# Patient Record
Sex: FEMALE | Race: WHITE | NOT HISPANIC OR LATINO | Employment: FULL TIME | URBAN - METROPOLITAN AREA
[De-identification: names, ages, dates, MRNs, and addresses within clinical notes are randomized per-mention and may not be internally consistent; named-entity substitution may affect disease eponyms.]

---

## 2017-01-10 ENCOUNTER — HOSPITAL ENCOUNTER (OUTPATIENT)
Dept: LAB | Facility: HOSPITAL | Age: 27
Setting detail: SPECIMEN
Discharge: HOME | End: 2017-01-10
Attending: INTERNAL MEDICINE | Admitting: INTERNAL MEDICINE

## 2017-01-10 LAB
ALBUMIN SERPL-MCNC: 3.5 G/DL (ref 3.5–4.8)
ALBUMIN/GLOB SERPL: 1.2 {RATIO} (ref 1–1.7)
ALP SERPL-CCNC: 38 IU/L (ref 32–91)
ALT SERPL-CCNC: 13 IU/L (ref 14–54)
ANION GAP SERPL CALC-SCNC: 8.6 MMOL/L (ref 10–20)
AST SERPL-CCNC: 20 IU/L (ref 15–41)
BILIRUB SERPL-MCNC: 1 MG/DL (ref 0.3–1.2)
BUN SERPL-MCNC: 9 MG/DL (ref 8–20)
BUN/CREAT SERPL: 15 (ref 5.4–26.2)
CALCIUM SERPL-MCNC: 8.9 MG/DL (ref 8.9–10.3)
CHLORIDE SERPL-SCNC: 106 MMOL/L (ref 101–111)
CHOLEST SERPL-MCNC: 196 MG/DL
CHOLEST/HDLC SERPL: 2.5 {RATIO}
CONV CO2: 29 MMOL/L (ref 22–32)
CONV LDL CHOLESTEROL DIRECT: 99 MG/DL (ref 0–100)
CONV MICROALBUM.,U,RANDOM: 22 MG/L
CONV TOTAL PROTEIN: 6.5 G/DL (ref 6.1–7.9)
CREAT 24H UR-MCNC: 260.3 MG/DL
CREAT UR-MCNC: 0.6 MG/DL (ref 0.4–1)
GLOBULIN UR ELPH-MCNC: 3 G/DL (ref 2.5–3.8)
GLUCOSE SERPL-MCNC: 92 MG/DL (ref 65–99)
HDLC SERPL-MCNC: 78 MG/DL
LDLC/HDLC SERPL: 1.3 {RATIO}
LIPID INTERPRETATION: NORMAL
MICROALBUMIN/CREAT UR: 8.5 UG/MG
POTASSIUM SERPL-SCNC: 3.6 MMOL/L (ref 3.6–5.1)
SODIUM SERPL-SCNC: 140 MMOL/L (ref 136–144)
TRIGL SERPL-MCNC: 83 MG/DL
VLDLC SERPL CALC-MCNC: 18.9 MG/DL

## 2017-01-17 ENCOUNTER — CONVERSION ENCOUNTER (OUTPATIENT)
Dept: ENDOCRINOLOGY | Facility: CLINIC | Age: 27
End: 2017-01-17

## 2017-03-03 ENCOUNTER — TELEPHONE (OUTPATIENT)
Dept: OBSTETRICS AND GYNECOLOGY | Age: 27
End: 2017-03-03

## 2017-03-03 RX ORDER — FLUCONAZOLE 150 MG/1
150 TABLET ORAL
Qty: 2 TABLET | Refills: 0 | Status: SHIPPED | OUTPATIENT
Start: 2017-03-03 | End: 2019-07-11 | Stop reason: SDUPTHER

## 2017-07-17 ENCOUNTER — HOSPITAL ENCOUNTER (OUTPATIENT)
Dept: LAB | Facility: HOSPITAL | Age: 27
Setting detail: SPECIMEN
Discharge: HOME | End: 2017-07-17
Attending: INTERNAL MEDICINE | Admitting: INTERNAL MEDICINE

## 2017-07-24 ENCOUNTER — CONVERSION ENCOUNTER (OUTPATIENT)
Dept: ENDOCRINOLOGY | Facility: CLINIC | Age: 27
End: 2017-07-24

## 2017-09-15 ENCOUNTER — TELEPHONE (OUTPATIENT)
Dept: OBSTETRICS AND GYNECOLOGY | Age: 27
End: 2017-09-15

## 2017-09-15 RX ORDER — NORGESTIMATE AND ETHINYL ESTRADIOL 7DAYSX3 28
1 KIT ORAL DAILY
Qty: 90 TABLET | Refills: 1 | Status: SHIPPED | OUTPATIENT
Start: 2017-09-15 | End: 2018-01-09 | Stop reason: SDUPTHER

## 2018-01-09 ENCOUNTER — OFFICE VISIT (OUTPATIENT)
Dept: OBSTETRICS AND GYNECOLOGY | Age: 28
End: 2018-01-09

## 2018-01-09 VITALS
HEIGHT: 65 IN | WEIGHT: 144 LBS | DIASTOLIC BLOOD PRESSURE: 62 MMHG | BODY MASS INDEX: 23.99 KG/M2 | SYSTOLIC BLOOD PRESSURE: 100 MMHG

## 2018-01-09 DIAGNOSIS — Z01.419 ENCOUNTER FOR GYNECOLOGICAL EXAMINATION: Primary | ICD-10-CM

## 2018-01-09 DIAGNOSIS — Z30.09 GENERAL COUNSELING AND ADVICE FOR CONTRACEPTIVE MANAGEMENT: ICD-10-CM

## 2018-01-09 PROCEDURE — 99395 PREV VISIT EST AGE 18-39: CPT | Performed by: OBSTETRICS & GYNECOLOGY

## 2018-01-09 RX ORDER — NORGESTIMATE AND ETHINYL ESTRADIOL 7DAYSX3 28
1 KIT ORAL DAILY
Qty: 90 TABLET | Refills: 4 | Status: SHIPPED | OUTPATIENT
Start: 2018-01-09 | End: 2018-09-07 | Stop reason: SDUPTHER

## 2018-01-09 NOTE — PROGRESS NOTES
"Subjective     Chief Complaint   Patient presents with   • Gynecologic Exam     Annual         History of Present Illness      Sandra Davidson is a very pleasant  27 y.o. female who presents for annual exam.  Mammo Exam In a, Contraception OCP's, Exercise 5 times a week including resistance and cardiovascular. Patient is happy on birth control pills. They want to postpone having children for a while. She has no dysmenorrhea or menorrhagia on the pill      Obstetric History:  OB History     No data available         Menstrual History:     Patient's last menstrual period was 12/26/2017.       Sexual History:       Past Medical History:   Diagnosis Date   • ASCUS of cervix with negative high risk HPV    • Bacterial vaginitis    • IDM (infant of diabetic mother)    • Proteinuria      Past Surgical History:   Procedure Laterality Date   • TOOTH EXTRACTION         SOCIAL Hx:      The following portions of the patient's history were reviewed and updated as appropriate: allergies, current medications, past family history, past medical history, past social history, past surgical history and problem list.    Review of Systems        Except as outlined in history of physical illness, patient denies any changes in her GYN, , GI systems.  All other systems reviewed were negative.         Objective   Physical Exam    /62  Ht 164.5 cm (64.75\")  Wt 65.3 kg (144 lb)  LMP 12/26/2017  BMI 24.15 kg/m2    General: Patient is alert and oriented and appears overall healthy  Neck: Is supple without thyromegaly, no carotid bruits and no lymphadenopathy  Lungs: Clear bilaterally, no wheezing, rhonchi, or rales.  Respiratory rate is normal  Breast: Even, symmetrical, no lymphadenopathy, no retraction, no masses or cysts  Heart: Regular rate and rhythm are appreciated, no murmurs or rubs are heard  Abdomen: Is soft, without organomegaly, bowel sounds are positive, there is no                                rebound or " guarding and palpation does not produce any discomfort  Back: Nontender without CVA tenderness  Pelvic: External genitalia appear normal and consistent with mature female.  BUS normal                            Vagina is clean dry without discharge and appears adequately estrogenized, no               lesions or masses are present                         Cervix is noninflamed without discharge or lesions.  There is no cervical motion             tenderness.                Uterus is nonenlarged, without tenderness, and no masses or abnormalities are  present               Adnexa are non-enlarged, non tender               Rectal exam reveals adequate sphincter tone and no masses or lesions are                     appreciated on digital rectal examination.    Patient Active Problem List   Diagnosis   • IDM (infant of diabetic mother)                 Assessment/Plan   Sandra was seen today for gynecologic exam.    Diagnoses and all orders for this visit:    Encounter for gynecological examination  -     IGP, Apt HPV,rfx 16 / 18,45 - ThinPrep Vial, Cervix    General counseling and advice for contraceptive management    IDM (infant of diabetic mother)    Other orders  -     norgestimate-ethinyl estradiol (TRINESSA, 28,) 0.18/0.215/0.25 MG-35 MCG per tablet; Take 1 tablet by mouth Daily.        Annual Well Woman Exam    Discussed today's findings and concerns with patient in detail.  Continue to recommend regular exercise to include cardiovascular and resistance training and breast self-exam. Wellness lab, mammography, and pap smear, in accordance with age guidelines.  Nutritional  recommendations  were discussed.    All of the patient's questions were addressed and answered, I have encouraged her to call for today's test results if she has not received them within 10 days.  Patient is advised to call with any change in her condition or with any other questions, otherwise return in 12 months for annual examination.

## 2018-01-11 LAB
CYTOLOGIST CVX/VAG CYTO: NORMAL
CYTOLOGY CVX/VAG DOC THIN PREP: NORMAL
DX ICD CODE: NORMAL
HIV 1 & 2 AB SER-IMP: NORMAL
HPV I/H RISK 4 DNA CVX QL PROBE+SIG AMP: NEGATIVE
Lab: NORMAL
OTHER STN SPEC: NORMAL
PATH REPORT.FINAL DX SPEC: NORMAL
STAT OF ADQ CVX/VAG CYTO-IMP: NORMAL

## 2018-01-23 ENCOUNTER — HOSPITAL ENCOUNTER (OUTPATIENT)
Dept: LAB | Facility: HOSPITAL | Age: 28
Setting detail: SPECIMEN
Discharge: HOME | End: 2018-01-23
Attending: INTERNAL MEDICINE | Admitting: INTERNAL MEDICINE

## 2018-01-23 LAB
ALBUMIN SERPL-MCNC: 3.8 G/DL (ref 3.5–4.8)
ALBUMIN/GLOB SERPL: 1.2 {RATIO} (ref 1–1.7)
ALP SERPL-CCNC: 36 IU/L (ref 32–91)
ALT SERPL-CCNC: 18 IU/L (ref 14–54)
ANION GAP SERPL CALC-SCNC: 11.7 MMOL/L (ref 10–20)
AST SERPL-CCNC: 26 IU/L (ref 15–41)
BILIRUB SERPL-MCNC: 1.1 MG/DL (ref 0.3–1.2)
BUN SERPL-MCNC: 9 MG/DL (ref 8–20)
BUN/CREAT SERPL: 12.9 (ref 5.4–26.2)
CALCIUM SERPL-MCNC: 9 MG/DL (ref 8.9–10.3)
CHLORIDE SERPL-SCNC: 104 MMOL/L (ref 101–111)
CHOLEST SERPL-MCNC: 221 MG/DL
CHOLEST/HDLC SERPL: 2.9 {RATIO}
CONV CO2: 26 MMOL/L (ref 22–32)
CONV LDL CHOLESTEROL DIRECT: 127 MG/DL (ref 0–100)
CONV MICROALBUM.,U,RANDOM: 16 MG/L
CONV TOTAL PROTEIN: 7.1 G/DL (ref 6.1–7.9)
CREAT 24H UR-MCNC: 179.4 MG/DL
CREAT UR-MCNC: 0.7 MG/DL (ref 0.4–1)
GLOBULIN UR ELPH-MCNC: 3.3 G/DL (ref 2.5–3.8)
GLUCOSE SERPL-MCNC: 88 MG/DL (ref 65–99)
HDLC SERPL-MCNC: 77 MG/DL
LDLC/HDLC SERPL: 1.7 {RATIO}
LIPID INTERPRETATION: ABNORMAL
MICROALBUMIN/CREAT UR: 8.9 UG/MG
POTASSIUM SERPL-SCNC: 3.7 MMOL/L (ref 3.6–5.1)
SODIUM SERPL-SCNC: 138 MMOL/L (ref 136–144)
TRIGL SERPL-MCNC: 67 MG/DL
VLDLC SERPL CALC-MCNC: 17.2 MG/DL

## 2018-01-30 ENCOUNTER — CONVERSION ENCOUNTER (OUTPATIENT)
Dept: ENDOCRINOLOGY | Facility: CLINIC | Age: 28
End: 2018-01-30

## 2018-07-25 ENCOUNTER — HOSPITAL ENCOUNTER (OUTPATIENT)
Dept: LAB | Facility: HOSPITAL | Age: 28
Setting detail: SPECIMEN
Discharge: HOME | End: 2018-07-25
Attending: INTERNAL MEDICINE | Admitting: INTERNAL MEDICINE

## 2018-08-01 ENCOUNTER — CONVERSION ENCOUNTER (OUTPATIENT)
Dept: ENDOCRINOLOGY | Facility: CLINIC | Age: 28
End: 2018-08-01

## 2018-09-07 RX ORDER — NORGESTIMATE AND ETHINYL ESTRADIOL 7DAYSX3 28
1 KIT ORAL DAILY
Qty: 90 TABLET | Refills: 3 | Status: SHIPPED | OUTPATIENT
Start: 2018-09-07 | End: 2019-01-15 | Stop reason: SDUPTHER

## 2019-01-15 ENCOUNTER — TELEPHONE (OUTPATIENT)
Dept: OBSTETRICS AND GYNECOLOGY | Age: 29
End: 2019-01-15

## 2019-01-15 ENCOUNTER — OFFICE VISIT (OUTPATIENT)
Dept: OBSTETRICS AND GYNECOLOGY | Age: 29
End: 2019-01-15

## 2019-01-15 VITALS
DIASTOLIC BLOOD PRESSURE: 72 MMHG | WEIGHT: 146 LBS | SYSTOLIC BLOOD PRESSURE: 120 MMHG | HEIGHT: 65 IN | BODY MASS INDEX: 24.32 KG/M2

## 2019-01-15 DIAGNOSIS — Z30.09 GENERAL COUNSELING AND ADVICE FOR CONTRACEPTIVE MANAGEMENT: ICD-10-CM

## 2019-01-15 DIAGNOSIS — Z01.419 ENCOUNTER FOR GYNECOLOGICAL EXAMINATION: Primary | ICD-10-CM

## 2019-01-15 PROCEDURE — 99395 PREV VISIT EST AGE 18-39: CPT | Performed by: OBSTETRICS & GYNECOLOGY

## 2019-01-15 RX ORDER — LEVOTHYROXINE SODIUM 0.1 MG/1
TABLET ORAL
COMMUNITY
End: 2020-01-21

## 2019-01-15 RX ORDER — NORGESTIMATE AND ETHINYL ESTRADIOL 7DAYSX3 28
1 KIT ORAL DAILY
Qty: 84 TABLET | Refills: 3 | Status: SHIPPED | OUTPATIENT
Start: 2019-01-15 | End: 2019-01-28 | Stop reason: SDUPTHER

## 2019-01-15 RX ORDER — NORGESTIMATE AND ETHINYL ESTRADIOL 7DAYSX3 28
1 KIT ORAL DAILY
Qty: 90 TABLET | Refills: 3 | Status: SHIPPED | OUTPATIENT
Start: 2019-01-15 | End: 2019-01-15

## 2019-01-15 RX ORDER — NORGESTIMATE AND ETHINYL ESTRADIOL 7DAYSX3 28
1 KIT ORAL DAILY
Qty: 78 TABLET | Refills: 4 | Status: SHIPPED | OUTPATIENT
Start: 2019-01-15 | End: 2019-01-15

## 2019-01-15 NOTE — PROGRESS NOTES
"Subjective     Chief Complaint   Patient presents with   • Gynecologic Exam     Annual:last pap 1/218,discuss different ocp's,trinessa d/c         History of Present Illness      Sandra Davidson is a very pleasant  28 y.o. female who presents for annual exam.  Mammo Exam no, Contraception OCPs, Exercise 5 times a week  Patient is doing well once to stay on OCPs, is following up with endocrinologist for all of her labs.      Obstetric History:  OB History     No data available         Menstrual History:     Patient's last menstrual period was 01/08/2019 (approximate).       Sexual History:       Past Medical History:   Diagnosis Date   • ASCUS of cervix with negative high risk HPV    • Bacterial vaginitis    • IDM (infant of diabetic mother)    • Proteinuria      Past Surgical History:   Procedure Laterality Date   • TOOTH EXTRACTION         SOCIAL Hx:      The following portions of the patient's history were reviewed and updated as appropriate: allergies, current medications, past family history, past medical history, past social history, past surgical history and problem list.    Review of Systems        Except as outlined in history of physical illness, patient denies any changes in her GYN, , GI systems.  All other systems reviewed were negative.         Objective   Physical Exam    /72   Ht 164.5 cm (64.75\")   Wt 66.2 kg (146 lb)   LMP 01/08/2019 (Approximate)   BMI 24.48 kg/m²     General: Patient is alert and oriented and appears overall healthy  Neck: Is supple without thyromegaly, no carotid bruits and no lymphadenopathy  Lungs: Clear bilaterally, no wheezing, rhonchi, or rales.  Respiratory rate is normal  Breast: Even, symmetrical, no lymphadenopathy, no retraction, no masses or cysts  Heart: Regular rate and rhythm are appreciated, no murmurs or rubs are heard  Abdomen: Is soft, without organomegaly, bowel sounds are positive, there is no                                rebound or " guarding and palpation does not produce any discomfort  Back: Nontender without CVA tenderness  Pelvic: External genitalia appear normal and consistent with mature female.  BUS normal                            Vagina is clean dry without discharge and appears adequately estrogenized, no               lesions or masses are present                         Cervix is noninflamed without discharge or lesions.  There is no cervical motion             tenderness.                Uterus is nonenlarged, without tenderness, and no masses or abnormalities are  present               Adnexa are non-enlarged, non tender               Rectal exam reveals adequate sphincter tone and no masses or lesions are                     appreciated on digital rectal examination.      Annual Well Woman Exam  Patient Active Problem List   Diagnosis   • IDM (infant of diabetic mother)                 Assessment/Plan   Sandra was seen today for gynecologic exam.    Diagnoses and all orders for this visit:    Encounter for gynecological examination  -     IGP, Apt HPV,rfx 16 / 18,45    General counseling and advice for contraceptive management    Other orders  -     Discontinue: norgestimate-ethinyl estradiol (TRINESSA, 28,) 0.18/0.215/0.25 MG-35 MCG per tablet; Take 1 tablet by mouth Daily.  -     norgestimate-ethinyl estradiol (TRI-SPRINTEC) 0.18/0.215/0.25 MG-35 MCG per tablet; Take 1 tablet by mouth Daily.            Discussed today's findings and concerns with patient in detail.  Continue to recommend regular exercise to include cardiovascular and resistance training and breast self-exam. Wellness lab, mammography, and pap smear, in accordance with age guidelines.  Nutritional  recommendations  were discussed.    All of the patient's questions were addressed and answered, I have encouraged her to call for today's test results if she has not received them within 10 days.  Patient is advised to call with any change in her condition or with  any other questions, otherwise return in 12 months for annual examination.

## 2019-01-15 NOTE — TELEPHONE ENCOUNTER
Esme called from [facility] stating that RX for Sprintec needs to be #84 with 3 refills. Please resend

## 2019-01-19 LAB
CYTOLOGIST CVX/VAG CYTO: NORMAL
CYTOLOGY CVX/VAG DOC THIN PREP: NORMAL
DX ICD CODE: NORMAL
HIV 1 & 2 AB SER-IMP: NORMAL
HPV I/H RISK 4 DNA CVX QL PROBE+SIG AMP: NEGATIVE
OTHER STN SPEC: NORMAL
PATH REPORT.FINAL DX SPEC: NORMAL
STAT OF ADQ CVX/VAG CYTO-IMP: NORMAL

## 2019-01-28 RX ORDER — NORGESTIMATE AND ETHINYL ESTRADIOL 7DAYSX3 28
1 KIT ORAL DAILY
Qty: 84 TABLET | Refills: 3 | Status: SHIPPED | OUTPATIENT
Start: 2019-01-28 | End: 2019-10-15

## 2019-01-30 ENCOUNTER — TELEPHONE (OUTPATIENT)
Dept: OBSTETRICS AND GYNECOLOGY | Age: 29
End: 2019-01-30

## 2019-02-05 ENCOUNTER — CONVERSION ENCOUNTER (OUTPATIENT)
Dept: ENDOCRINOLOGY | Facility: CLINIC | Age: 29
End: 2019-02-05

## 2019-06-04 VITALS
DIASTOLIC BLOOD PRESSURE: 70 MMHG | WEIGHT: 148 LBS | OXYGEN SATURATION: 99 % | WEIGHT: 146 LBS | BODY MASS INDEX: 24.15 KG/M2 | HEART RATE: 67 BPM | SYSTOLIC BLOOD PRESSURE: 105 MMHG | HEIGHT: 65 IN | HEART RATE: 67 BPM | OXYGEN SATURATION: 99 % | WEIGHT: 144 LBS | BODY MASS INDEX: 24.49 KG/M2 | HEART RATE: 68 BPM | OXYGEN SATURATION: 97 % | BODY MASS INDEX: 25.96 KG/M2 | HEIGHT: 65 IN | BODY MASS INDEX: 24.3 KG/M2 | SYSTOLIC BLOOD PRESSURE: 104 MMHG | OXYGEN SATURATION: 99 % | SYSTOLIC BLOOD PRESSURE: 120 MMHG | DIASTOLIC BLOOD PRESSURE: 80 MMHG | BODY MASS INDEX: 24.66 KG/M2 | HEART RATE: 78 BPM | SYSTOLIC BLOOD PRESSURE: 130 MMHG | DIASTOLIC BLOOD PRESSURE: 80 MMHG | SYSTOLIC BLOOD PRESSURE: 125 MMHG | DIASTOLIC BLOOD PRESSURE: 69 MMHG | OXYGEN SATURATION: 98 % | DIASTOLIC BLOOD PRESSURE: 70 MMHG | HEART RATE: 84 BPM | WEIGHT: 156 LBS | WEIGHT: 147 LBS

## 2019-07-11 ENCOUNTER — TELEPHONE (OUTPATIENT)
Dept: OBSTETRICS AND GYNECOLOGY | Age: 29
End: 2019-07-11

## 2019-07-11 RX ORDER — FLUCONAZOLE 150 MG/1
150 TABLET ORAL
Qty: 2 TABLET | Refills: 0 | Status: SHIPPED | OUTPATIENT
Start: 2019-07-11 | End: 2019-09-09 | Stop reason: SDUPTHER

## 2019-07-11 NOTE — TELEPHONE ENCOUNTER
Dr QUINTERO pt believes she has the beginning of a yeast inf, sx: little itch and d/c, on bcp. Pt requests diflucan to pharm on file, [facility].    NO NEED TO CALL PT UNLESS ISSUES.

## 2019-07-16 RX ORDER — LEVOTHYROXINE SODIUM 50 MCG
TABLET ORAL
Qty: 90 TABLET | Refills: 3 | Status: SHIPPED | OUTPATIENT
Start: 2019-07-16 | End: 2020-06-30

## 2019-08-06 ENCOUNTER — OFFICE VISIT (OUTPATIENT)
Dept: ENDOCRINOLOGY | Facility: CLINIC | Age: 29
End: 2019-08-06

## 2019-08-06 VITALS
BODY MASS INDEX: 26.33 KG/M2 | SYSTOLIC BLOOD PRESSURE: 108 MMHG | HEIGHT: 65 IN | DIASTOLIC BLOOD PRESSURE: 82 MMHG | HEART RATE: 72 BPM | WEIGHT: 158 LBS | OXYGEN SATURATION: 97 %

## 2019-08-06 DIAGNOSIS — E10.65 TYPE 1 DIABETES MELLITUS WITH HYPERGLYCEMIA (HCC): Primary | ICD-10-CM

## 2019-08-06 DIAGNOSIS — Z96.41 INSULIN PUMP STATUS: ICD-10-CM

## 2019-08-06 DIAGNOSIS — E06.3 HYPOTHYROIDISM DUE TO HASHIMOTO'S THYROIDITIS: ICD-10-CM

## 2019-08-06 DIAGNOSIS — E03.8 HYPOTHYROIDISM DUE TO HASHIMOTO'S THYROIDITIS: ICD-10-CM

## 2019-08-06 PROBLEM — E03.9 HYPOTHYROIDISM: Status: ACTIVE | Noted: 2018-01-30

## 2019-08-06 LAB — GLUCOSE BLDC GLUCOMTR-MCNC: 220 MG/DL (ref 70–130)

## 2019-08-06 PROCEDURE — 82962 GLUCOSE BLOOD TEST: CPT | Performed by: INTERNAL MEDICINE

## 2019-08-06 PROCEDURE — 99214 OFFICE O/P EST MOD 30 MIN: CPT | Performed by: INTERNAL MEDICINE

## 2019-08-06 RX ORDER — INSULIN GLARGINE 100 [IU]/ML
INJECTION, SOLUTION SUBCUTANEOUS DAILY
COMMUNITY

## 2019-08-06 NOTE — PATIENT INSTRUCTIONS
Continue exercise.  Increase basal rate to 0.9 units/h from 6a- midnight.  Wait 5-10 min to eat after breakfast bolus. Use extended bolus if needed.  Call if blood sugars are running under 100 or over 200.  F/u in 6 months, with fasting labs prior.

## 2019-08-12 NOTE — PROGRESS NOTES
[facility]        Patient Care Team:  Lara Rodríguez MD as PCP - General (Family Medicine)    Chief Complaint:    Chief Complaint   Patient presents with   • Diabetes     type 1 basal rates is 0.85 every hour          Subjective   Here for diabetes f/u  Blood sugars higher after breakfast  Checks blood sugars 4x/d for the last 3 months. Adjusts insulin dose based on results.  Insulin delivery per pump: Basal 42% / bolus 58%    Exercise program: doing cardio 2x/ week  Due for eye exam    Interval History:     Patient Complaints: none  Patient Denies:  hypoglycemia  History taken from: patient    Review of Systems:   Review of Systems   Eyes: Negative for blurred vision.   Gastrointestinal: Negative for nausea.   Endocrine: Negative for polyuria.   Neurological: Negative for headache.     Gained 10 lb since last visit    Objective     Vital Signs      Vitals:    08/06/19 0843   BP: 108/82   Pulse: 72   SpO2: 97%         Physical Exam:     General Appearance:    Alert, cooperative, in no acute distress   Head:    Normocephalic, without obvious abnormality, atraumatic   Eyes:            Lids and lashes normal, conjunctivae and sclerae normal, no   icterus, no pallor, corneas clear, PERRLA   Throat:   No oral lesions,  oral mucosa moist   Neck:   No adenopathy, supple,  no thyromegaly, no   carotid bruit   Lungs:     Clear    Heart:    Regular rhythm and normal rate   Chest Wall:    No abnormalities observed   Abdomen:     Normal bowel sounds, soft                 Extremities:   Moves all extremities well, no edema               Pulses:   Pulses palpable and equal bilaterally   Skin:   Pump site clean   Neurologic:  DTR 1+ in ankles, normal vibratory sense in toes, able to feel the 10g monofilament          Results Review:    I have reviewed the patient's new clinical results, labs & imaging.    Medication Review:   Prior to Admission medications    Medication Sig Start Date End Date Taking?  Authorizing Provider   glucagon (GLUCAGON EMERGENCY) 1 MG injection Inject 1 mg under the skin into the appropriate area as directed 1 (One) Time As Needed for Low Blood Sugar.   Yes Rick Ahuja MD   Glucose Blood (ELVIS CONTOUR TEST VI) by In Vitro route. Use to check blood sugar before meals and at bedtime   Yes Rick Ahuja MD   insulin glargine (LANTUS) 100 UNIT/ML injection Inject  under the skin into the appropriate area as directed Daily. Use in case of pump failure 28 units daily   Yes Rick Ahuja MD   Multiple Vitamins-Minerals (MULTIVITAMIN ADULT PO) Take  by mouth Daily.   Yes Rick Auhja MD   norgestimate-ethinyl estradiol (TRI-SPRINTEC) 0.18/0.215/0.25 MG-35 MCG per tablet Take 1 tablet by mouth Daily. 1/28/19 1/28/20 Yes Iban Jacobs MD   NOVOLOG 100 UNIT/ML injection 1 unit per 8 grams carbs at breakfast, 1 unit per 10 carbs for lunch/supper max dose: 60 units per day use with the insulin pump dx:e10.65 7/19/19  Yes Dinesh Martin MD   SYNTHROID 50 MCG tablet TAKE 1 TABLET DAILY 7/16/19  Yes Dinesh Martin MD   fluconazole (DIFLUCAN) 150 MG tablet Take 1 tablet by mouth Every 3 (Three) Days. 7/11/19   Roz Lujan PA   insulin lispro (HUMALOG) 100 UNIT/ML injection Inject  under the skin into the appropriate area as directed 3 (Three) Times a Day Before Meals.    Rick Ahuja MD   levothyroxine (SYNTHROID, LEVOTHROID) 100 MCG tablet     Rick Ahuja MD       Lab Results (most recent)     Procedure Component Value Units Date/Time    POC Glucose Fingerstick [[id number]]  (Abnormal) Collected:  08/06/19 0850    Specimen:  Blood Updated:  08/06/19 0851     Glucose 220 mg/dL      Comment: ate@730a this morning, 1.5 hours ago                  Lab Results   Component Value Date    GLUCOSE 88 01/23/2018    BUN 9 01/23/2018    CREATININE 0.7 01/23/2018    BCR 12.9 01/23/2018    K 3.7 01/23/2018    CO2 26 01/23/2018    CALCIUM 9.0  01/23/2018    ALBUMIN 3.8 01/23/2018    LABIL2 1.2 01/23/2018    AST 26 01/23/2018    ALT 18 01/23/2018    CHOL 221 (H) 01/23/2018     (H) 01/23/2018    HDL 77 01/23/2018    TRIG 67 01/23/2018     A1C 9.4% ( same ); TSH 0.99, FreeT4 1.2    Assessment/Plan     Sandra was seen today for diabetes.    Diagnoses and all orders for this visit:    Type 1 diabetes mellitus with hyperglycemia (CMS/[facility])  -     POC Glucose Fingerstick  -     Hemoglobin A1c; Future  -     Microalbumin / Creatinine Urine Ratio - Urine, Clean Catch; Future  -     Comprehensive Metabolic Panel; Future    Insulin pump status    Hypothyroidism due to Hashimoto's thyroiditis  -     Lipid Panel; Future    Wearing a MiniMed 670G insulin pump since August 2018. Not using sensors.    Continue exercise.  Increase basal rate to 0.9 units/h from 6a- midnight.  Wait 5-10 min to eat after breakfast bolus. Use extended bolus if needed.  Call if blood sugars are running under 100 or over 200.  See eye doctor        Dinesh Martin MD  08/11/19  10:29 PM

## 2019-09-09 ENCOUNTER — TELEPHONE (OUTPATIENT)
Dept: OBSTETRICS AND GYNECOLOGY | Age: 29
End: 2019-09-09

## 2019-09-09 RX ORDER — FLUCONAZOLE 150 MG/1
150 TABLET ORAL
Qty: 2 TABLET | Refills: 0 | Status: SHIPPED | OUTPATIENT
Start: 2019-09-09 | End: 2020-01-21

## 2019-09-09 NOTE — TELEPHONE ENCOUNTER
Dr Brown [place] has a ho of yeast inf, wants to take a Rx of Diflucan with her prophylactic, please send to pharm on file, [facility]. Dr brown post call

## 2019-09-23 ENCOUNTER — TELEPHONE (OUTPATIENT)
Dept: OBSTETRICS AND GYNECOLOGY | Age: 29
End: 2019-09-23

## 2019-09-23 NOTE — TELEPHONE ENCOUNTER
Please offer appt for 10/15/19  9:30 with ARNAUD/S 2 and 9:45 with .Doesn't matter if she is on her cycle.

## 2019-09-23 NOTE — TELEPHONE ENCOUNTER
Pt would like appt for irregular cycles.  She is on ocp  CYCLES lasting 14 days.  Does she need u/s with appt??  She is on her cycle right now.  She wanted to know if she should wait until she is not on her period??    [phone]

## 2019-10-15 ENCOUNTER — PROCEDURE VISIT (OUTPATIENT)
Dept: OBSTETRICS AND GYNECOLOGY | Age: 29
End: 2019-10-15

## 2019-10-15 ENCOUNTER — OFFICE VISIT (OUTPATIENT)
Dept: OBSTETRICS AND GYNECOLOGY | Age: 29
End: 2019-10-15

## 2019-10-15 VITALS
HEIGHT: 65 IN | WEIGHT: 154 LBS | SYSTOLIC BLOOD PRESSURE: 100 MMHG | DIASTOLIC BLOOD PRESSURE: 68 MMHG | BODY MASS INDEX: 25.66 KG/M2

## 2019-10-15 DIAGNOSIS — N92.6 IRREGULAR MENSES: Primary | ICD-10-CM

## 2019-10-15 DIAGNOSIS — E03.8 HYPOTHYROIDISM DUE TO HASHIMOTO'S THYROIDITIS: ICD-10-CM

## 2019-10-15 DIAGNOSIS — E06.3 HYPOTHYROIDISM DUE TO HASHIMOTO'S THYROIDITIS: ICD-10-CM

## 2019-10-15 DIAGNOSIS — E10.9 TYPE 1 DIABETES MELLITUS WITHOUT COMPLICATION (HCC): ICD-10-CM

## 2019-10-15 PROCEDURE — 99213 OFFICE O/P EST LOW 20 MIN: CPT | Performed by: OBSTETRICS & GYNECOLOGY

## 2019-10-15 PROCEDURE — 76830 TRANSVAGINAL US NON-OB: CPT | Performed by: OBSTETRICS & GYNECOLOGY

## 2019-10-15 NOTE — PROGRESS NOTES
Subjective     Chief Complaint   Patient presents with   • Gynecologic Exam     GYN U/S:irregular menses       History of Present Illness  Sandra Davidson is a 29 y.o. female is being seen today for irregular menses while on her triphasic type of birth control pills.  Patient has several medical issues including thyroid abnormalities presumed secondary to Hashimoto's and diabetes.  Her glucoses were not very well controlled when she started having some of her irregular menses while on birth control pills.  She change the way she was taking her pills trying to space her cycle part 1 month and try to make it a little closer the next month and since that time is never really had a good bleeding pattern.  Bleeding is described as prolonged often last in 13 to 14 days but very light.  She has no galactorrhea or hirsutism and is not taking any herbs or supplements UCG today was negative.  Chief Complaint   Patient presents with   • Gynecologic Exam     GYN U/S:irregular menses   .        The following portions of the patient's history were reviewed and updated as appropriate: allergies, current medications, past family history, past medical history, past social history, past surgical history and problem list.    PAST MEDICAL HISTORY  Past Medical History:   Diagnosis Date   • ASCUS of cervix with negative high risk HPV    • Bacterial vaginitis    • Diabetes mellitus type I (CMS/HCC)    • Hypothyroidism    • IDM (infant of diabetic mother)    • Proteinuria      OB History   No data available     Past Surgical History:   Procedure Laterality Date   • TOOTH EXTRACTION     • WISDOM TOOTH EXTRACTION       Family History   Problem Relation Age of Onset   • Heart disease Father    • Asthma Father    • Crohn's disease Sister    • Diabetes Maternal Aunt    • Diabetes Maternal Grandmother    • Heart disease Maternal Grandfather    • Lung cancer Maternal Grandfather      Social History     Tobacco Use   Smoking Status  Never Smoker   Smokeless Tobacco Never Used       Current Outpatient Medications:   •  glucagon (GLUCAGON EMERGENCY) 1 MG injection, Inject 1 mg under the skin into the appropriate area as directed 1 (One) Time As Needed for Low Blood Sugar., Disp: , Rfl:   •  Glucose Blood (ELVIS CONTOUR TEST VI), by In Vitro route. Use to check blood sugar before meals and at bedtime, Disp: , Rfl:   •  insulin glargine (LANTUS) 100 UNIT/ML injection, Inject  under the skin into the appropriate area as directed Daily. Use in case of pump failure 28 units daily, Disp: , Rfl:   •  insulin lispro (HUMALOG) 100 UNIT/ML injection, Inject  under the skin into the appropriate area as directed 3 (Three) Times a Day Before Meals., Disp: , Rfl:   •  Multiple Vitamins-Minerals (MULTIVITAMIN ADULT PO), Take  by mouth Daily., Disp: , Rfl:   •  NOVOLOG 100 UNIT/ML injection, 1 unit per 8 grams carbs at breakfast, 1 unit per 10 carbs for lunch/supper max dose: 60 units per day use with the insulin pump dx:e10.65, Disp: 60 mL, Rfl: 3  •  SYNTHROID 50 MCG tablet, TAKE 1 TABLET DAILY, Disp: 90 tablet, Rfl: 3  •  fluconazole (DIFLUCAN) 150 MG tablet, Take 1 tablet by mouth Every 3 (Three) Days., Disp: 2 tablet, Rfl: 0  •  levothyroxine (SYNTHROID, LEVOTHROID) 100 MCG tablet, , Disp: , Rfl:   •  norethindrone-ethinyl estradiol (NORTREL 1/35, 21,) 1-35 MG-MCG per tablet, Take 1 tablet by mouth Daily., Disp: 28 tablet, Rfl: 12  Immunization History   Administered Date(s) Administered   • Pneumococcal, Unspecified 01/01/2016       Review of Systems       Except as outlined in history of physical illness, patient denies any changes in her GYN, , GI systems. All other systems reviewed are negative.    Objective   Physical Exam   Alert and oriented, respirations unlabored, heart regular rate and rhythm   Pelvic declined only wanted consult      Assessment/Plan   Sandra was seen today for gynecologic exam.    Diagnoses and all orders for this  visit:    Irregular menses  Today's ultrasound fortunately showed a very thin endometrial stripe normal uterus normal ovaries  I suspect her bleeding is breakthrough bleeding secondary to a low-dose birth control pills with not real well controlled diabetes.  Different options were reviewed.  They think they might want to attempt pregnancy next year so they do not want to do anything long-term.  We have decided to change from triphasics to monophasic 135's.  She will call if that does not better control her cycle.    She is due to come in January for an examination.        Hypothyroidism due to Hashimoto's thyroiditis    Type 1 diabetes mellitus without complication (CMS/[facility])    Other orders  -     norethindrone-ethinyl estradiol (NORTREL 1/35, 21,) 1-35 MG-MCG per tablet; Take 1 tablet by mouth Daily.                         EMR Dragon/ Transcription disclaimer:  Much of the encounter note is an electronic transcription/translation of spoken language to printed text. The electronic translation of spoken language may permit erroneous, or at times, nonessential words or phrases to be inadvertently transcribes; Although i have reviewed the note for such errors, some may still exist.

## 2019-12-27 RX ORDER — NORGESTIMATE AND ETHINYL ESTRADIOL 7DAYSX3 28
KIT ORAL
Qty: 84 TABLET | Refills: 3 | OUTPATIENT
Start: 2019-12-27

## 2020-01-21 ENCOUNTER — OFFICE VISIT (OUTPATIENT)
Dept: OBSTETRICS AND GYNECOLOGY | Age: 30
End: 2020-01-21

## 2020-01-21 VITALS
DIASTOLIC BLOOD PRESSURE: 66 MMHG | HEIGHT: 65 IN | SYSTOLIC BLOOD PRESSURE: 110 MMHG | WEIGHT: 155 LBS | BODY MASS INDEX: 25.83 KG/M2

## 2020-01-21 DIAGNOSIS — Z30.09 GENERAL COUNSELING AND ADVICE FOR CONTRACEPTIVE MANAGEMENT: ICD-10-CM

## 2020-01-21 DIAGNOSIS — Z12.4 SCREENING FOR MALIGNANT NEOPLASM OF CERVIX: ICD-10-CM

## 2020-01-21 DIAGNOSIS — Z00.00 ENCOUNTER FOR ANNUAL PHYSICAL EXAM: Primary | ICD-10-CM

## 2020-01-21 PROCEDURE — 99395 PREV VISIT EST AGE 18-39: CPT | Performed by: OBSTETRICS & GYNECOLOGY

## 2020-01-21 NOTE — PROGRESS NOTES
"Subjective     Chief Complaint   Patient presents with   • Gynecologic Exam     annual. last pap 1/15/19(-)          History of Present Illness      Sandra Davidson is a very pleasant  29 y.o. female who presents for annual exam.  Mammo Exam none, Contraception OCPs, Exercise 5 times a week  Patient's diabetes seems to be well controlled   is happy on the monophasic birth control pills  She has no gynecologic concerns or complaints.      Obstetric History:  OB History    None        Menstrual History:     Patient's last menstrual period was 01/08/2020 (exact date).       Sexual History:       Past Medical History:   Diagnosis Date   • ASCUS of cervix with negative high risk HPV    • Bacterial vaginitis    • Diabetes mellitus type I (CMS/HCC)    • Hypothyroidism    • IDM (infant of diabetic mother)    • Proteinuria      Past Surgical History:   Procedure Laterality Date   • TOOTH EXTRACTION     • WISDOM TOOTH EXTRACTION         SOCIAL Hx:      The following portions of the patient's history were reviewed and updated as appropriate: allergies, current medications, past family history, past medical history, past social history, past surgical history and problem list.    Review of Systems        Except as outlined in history of physical illness, patient denies any changes in her GYN, , GI systems.  All other systems reviewed were negative.         Objective   Physical Exam    /66   Ht 165.1 cm (65\")   Wt 70.3 kg (155 lb)   LMP 01/08/2020 (Exact Date)   Breastfeeding No   BMI 25.79 kg/m²     General: Patient is alert and oriented and appears overall healthy  Neck: Is supple without thyromegaly, no carotid bruits and no lymphadenopathy  Lungs: Clear bilaterally, no wheezing, rhonchi, or rales.  Respiratory rate is normal  Breast: Even, symmetrical, no lymphadenopathy, no retraction, no masses or cysts  Heart: Regular rate and rhythm are appreciated, no murmurs or rubs are heard  Abdomen: Is " soft, without organomegaly, bowel sounds are positive, there is no                                rebound or guarding and palpation does not produce any discomfort  Back: Nontender without CVA tenderness  Pelvic: External genitalia appear normal and consistent with mature female.  BUS normal                            Vagina is clean dry without discharge and appears adequately estrogenized, no               lesions or masses are present                         Cervix is noninflamed without discharge or lesions.  There is no cervical motion             tenderness.                Uterus is nonenlarged, without tenderness, and no masses or abnormalities are  present               Adnexa are non-enlarged, non tender               Rectal exam reveals adequate sphincter tone and no masses or lesions are                     appreciated on digital rectal examination.      Annual Well Woman Exam  Patient Active Problem List   Diagnosis   • IDM (infant of diabetic mother)   • Vomiting   • Type 1 diabetes mellitus with hyperglycemia (CMS/HCC)   • Type 1 diabetes (CMS/HCC)   • Itching in the vaginal area   • Insulin pump status   • Hypothyroidism   • Diabetes mellitus type I (CMS/HCC)                 Assessment/Plan   Sandra was seen today for gynecologic exam.    Diagnoses and all orders for this visit:    Encounter for annual physical exam    General counseling and advice for contraceptive management    Other orders  -     norethindrone-ethinyl estradiol (NORTREL 1/35, 21,) 1-35 MG-MCG per tablet; Take 1 tablet by mouth Daily.    Follow-up with her PCP for her diabetes management      Discussed today's findings and concerns with patient.  Continue to recommend regular exercise including cardiovascular and resistance training as well as  breast self-exam. Wellness lab, mammography, & pap smear, in accordance with age guidelines.    I have encouraged her to call for today's test results if she has not received them within 10  days.  Patient is advised to call with any change in her condition or with any other questions, otherwise return  for annual examination.

## 2020-01-23 LAB
CYTOLOGIST CVX/VAG CYTO: NORMAL
CYTOLOGY CVX/VAG DOC CYTO: NORMAL
CYTOLOGY CVX/VAG DOC THIN PREP: NORMAL
DX ICD CODE: NORMAL
HIV 1 & 2 AB SER-IMP: NORMAL
HPV I/H RISK 4 DNA CVX QL PROBE+SIG AMP: NEGATIVE
OTHER STN SPEC: NORMAL
STAT OF ADQ CVX/VAG CYTO-IMP: NORMAL

## 2020-02-02 ENCOUNTER — RESULTS ENCOUNTER (OUTPATIENT)
Dept: ENDOCRINOLOGY | Facility: CLINIC | Age: 30
End: 2020-02-02

## 2020-02-02 DIAGNOSIS — E10.65 TYPE 1 DIABETES MELLITUS WITH HYPERGLYCEMIA (HCC): ICD-10-CM

## 2020-02-02 DIAGNOSIS — E03.8 HYPOTHYROIDISM DUE TO HASHIMOTO'S THYROIDITIS: ICD-10-CM

## 2020-02-02 DIAGNOSIS — E06.3 HYPOTHYROIDISM DUE TO HASHIMOTO'S THYROIDITIS: ICD-10-CM

## 2020-02-11 ENCOUNTER — OFFICE VISIT (OUTPATIENT)
Dept: ENDOCRINOLOGY | Facility: CLINIC | Age: 30
End: 2020-02-11

## 2020-02-11 VITALS
OXYGEN SATURATION: 95 % | WEIGHT: 154 LBS | HEIGHT: 65 IN | DIASTOLIC BLOOD PRESSURE: 68 MMHG | BODY MASS INDEX: 25.66 KG/M2 | HEART RATE: 60 BPM | SYSTOLIC BLOOD PRESSURE: 114 MMHG

## 2020-02-11 DIAGNOSIS — E03.8 HYPOTHYROIDISM DUE TO HASHIMOTO'S THYROIDITIS: ICD-10-CM

## 2020-02-11 DIAGNOSIS — E10.65 TYPE 1 DIABETES MELLITUS WITH HYPERGLYCEMIA (HCC): Primary | ICD-10-CM

## 2020-02-11 DIAGNOSIS — Z96.41 INSULIN PUMP STATUS: ICD-10-CM

## 2020-02-11 DIAGNOSIS — E06.3 HYPOTHYROIDISM DUE TO HASHIMOTO'S THYROIDITIS: ICD-10-CM

## 2020-02-11 LAB — GLUCOSE BLDC GLUCOMTR-MCNC: 221 MG/DL (ref 70–130)

## 2020-02-11 PROCEDURE — 82962 GLUCOSE BLOOD TEST: CPT | Performed by: INTERNAL MEDICINE

## 2020-02-11 PROCEDURE — 99214 OFFICE O/P EST MOD 30 MIN: CPT | Performed by: INTERNAL MEDICINE

## 2020-02-11 RX ORDER — CHLORAL HYDRATE 500 MG
CAPSULE ORAL
COMMUNITY

## 2020-02-11 NOTE — PATIENT INSTRUCTIONS
See eye doctor.  Continue exercise.  Bolus for every meal.  Call if blood sugars are running under 100 or over 200.  F/u in 6 months, with labs prior.

## 2020-02-19 NOTE — PROGRESS NOTES
[facility]        Patient Care Team:  Lara Rodríguez MD as PCP - General (Family Medicine)    Chief Complaint:    Chief Complaint   Patient presents with   • Diabetes     type 1 12a 0.85, 6a 0.90         Subjective   Here for diabetes f/u  Blood sugars: higher after eating. Doesn't give meal bolus often  Insulin delivery per pump: Basal 64% / bolus 36%  Exercise program: doing cardio & strength training 5 d / week  Due for eye exam    Interval History:     Patient Complaints: none  Patient Denies:  hypoglycemia  History taken from: patient    Review of Systems:   Review of Systems   Eyes: Negative for blurred vision.   Gastrointestinal: Negative for nausea.   Endocrine: Negative for polyuria.   Neurological: Negative for headache.     Lost  4 lb since last visit    Objective     Vital Signs      Vitals:    02/11/20 0836   BP: 114/68   Pulse: 60   SpO2: 95%         Physical Exam:     General Appearance:    Alert, cooperative, in no acute distress   Head:    Normocephalic, without obvious abnormality, atraumatic   Eyes:            Lids and lashes normal, conjunctivae and sclerae normal, no   icterus, no pallor, corneas clear, PERRLA   Throat:   No oral lesions,  oral mucosa moist   Neck:   No adenopathy, supple,  no thyromegaly, no   carotid bruit   Lungs:     Clear     Heart:    Regular rhythm and normal rate   Chest Wall:    No abnormalities observed   Abdomen:     Normal bowel sounds, soft                 Extremities:   Moves all extremities well, no edema               Pulses:   Pulses palpable and equal bilaterally   Skin:   Pump site clean   Neurologic:  DTR 1+, able to feel the 10g monofilament          Results Review:    I have reviewed the patient's new clinical results, labs & imaging.    Medication Review:   Prior to Admission medications    Medication Sig Start Date End Date Taking? Authorizing Provider   Cholecalciferol (VITAMIN D3) 125 MCG (5000 UT) capsule capsule Take 5,000  Units by mouth Daily.   Yes Rick Ahuja MD   glucagon (GLUCAGON EMERGENCY) 1 MG injection Inject 1 mg under the skin into the appropriate area as directed 1 (One) Time As Needed for Low Blood Sugar.   Yes Rick Ahuja MD   Glucose Blood (ELVIS CONTOUR TEST VI) by In Vitro route. Use to check blood sugar before meals and at bedtime   Yes Rick Ahuja MD   insulin glargine (LANTUS) 100 UNIT/ML injection Inject  under the skin into the appropriate area as directed Daily. Use in case of pump failure 28 units daily   Yes Rick Ahuja MD   Multiple Vitamins-Minerals (MULTIVITAMIN ADULT PO) Take  by mouth Daily.   Yes Rick Ahuja MD   norethindrone-ethinyl estradiol (NORTREL 1/35, 21,) 1-35 MG-MCG per tablet Take 1 tablet by mouth Daily. 1/21/20 1/20/21 Yes Iban Jacobs MD   NOVOLOG 100 UNIT/ML injection 1 unit per 8 grams carbs at breakfast, 1 unit per 10 carbs for lunch/supper max dose: 60 units per day use with the insulin pump dx:e10.65 7/19/19  Yes Dinesh Martin MD   Omega-3 Fatty Acids (FISH OIL) 1000 MG capsule capsule Take  by mouth Daily With Breakfast.   Yes Rick Ahuja MD   SYNTHROID 50 MCG tablet TAKE 1 TABLET DAILY 7/16/19  Yes Dinesh Martin MD       Lab Results (most recent)     Procedure Component Value Units Date/Time    POC Glucose Fingerstick [[id number]]  (Abnormal) Collected:  02/11/20 0835    Specimen:  Blood Updated:  02/11/20 0836     Glucose 221 mg/dL      Comment: ate@730a this morning, 1 hour ago,                  Lab Results   Component Value Date    GLUCOSE 88 01/23/2018    BUN 9 01/23/2018    CREATININE 0.7 01/23/2018    BCR 12.9 01/23/2018    K 3.7 01/23/2018    CO2 26 01/23/2018    CALCIUM 9.0 01/23/2018    ALBUMIN 3.8 01/23/2018    LABIL2 1.2 01/23/2018    AST 26 01/23/2018    ALT 18 01/23/2018    CHOL 221 (H) 01/23/2018     (H) 01/23/2018    HDL 77 01/23/2018    TRIG 67 01/23/2018     A1C same @ 9.3%; microalb/cr  ratio 7;  cr 0.81, K 4.2, ALT 11; Chol 228, Trigl 45 on 1/31/2020    Assessment/Plan     Sandra was seen today for diabetes.    Diagnoses and all orders for this visit:    Type 1 diabetes mellitus with hyperglycemia (CMS/[facility])  -     POC Glucose Fingerstick  -     Hemoglobin A1c; Future    Hypothyroidism due to Hashimoto's thyroiditis    Insulin pump status    Wearing a MiniMed 670G insulin pump since August 2018. Not using sensors.      See eye doctor.  Continue exercise.  Bolus for every meal.  Call if blood sugars are running under 100 or over 200.      Dinesh Martin MD  02/18/20  7:39 PM

## 2020-06-30 RX ORDER — LEVOTHYROXINE SODIUM 50 MCG
TABLET ORAL
Qty: 90 TABLET | Refills: 3 | Status: SHIPPED | OUTPATIENT
Start: 2020-06-30 | End: 2021-06-14

## 2020-08-09 ENCOUNTER — RESULTS ENCOUNTER (OUTPATIENT)
Dept: ENDOCRINOLOGY | Facility: CLINIC | Age: 30
End: 2020-08-09

## 2020-08-09 DIAGNOSIS — E10.65 TYPE 1 DIABETES MELLITUS WITH HYPERGLYCEMIA (HCC): ICD-10-CM

## 2020-08-19 ENCOUNTER — TELEMEDICINE (OUTPATIENT)
Dept: ENDOCRINOLOGY | Facility: CLINIC | Age: 30
End: 2020-08-19

## 2020-08-19 VITALS — WEIGHT: 158 LBS | BODY MASS INDEX: 26.33 KG/M2 | HEIGHT: 65 IN | TEMPERATURE: 98.5 F

## 2020-08-19 DIAGNOSIS — E10.65 TYPE 1 DIABETES MELLITUS WITH HYPERGLYCEMIA (HCC): Primary | ICD-10-CM

## 2020-08-19 DIAGNOSIS — Z96.41 INSULIN PUMP STATUS: ICD-10-CM

## 2020-08-19 DIAGNOSIS — E03.8 HYPOTHYROIDISM DUE TO HASHIMOTO'S THYROIDITIS: ICD-10-CM

## 2020-08-19 DIAGNOSIS — E06.3 HYPOTHYROIDISM DUE TO HASHIMOTO'S THYROIDITIS: ICD-10-CM

## 2020-08-19 DIAGNOSIS — E55.9 VITAMIN D DEFICIENCY: ICD-10-CM

## 2020-08-19 PROCEDURE — 99214 OFFICE O/P EST MOD 30 MIN: CPT | Performed by: INTERNAL MEDICINE

## 2020-08-23 NOTE — PROGRESS NOTES
[facility]        Patient Care Team:  Lara Rodríguez MD as PCP - General (Family Medicine)    Chief Complaint:    Chief Complaint   Patient presents with   • Diabetes     type 1 BS-152 ate@ 830a this morning, 10 min ago, basal is 0.9 all day         Subjective   Here for diabetes f/u  This is a telemedicine visit in view of the covid 19 pandemic  Blood sugars: higher after eating  Pt realized pump day & year display was wrong when she printed the blood sugars for the visit  Insulin delivery per pump: Basal 48% / bolus 52%  Exercise program: strength training 5d/week  Due for eye exam  LMP last week  Taking vit D    Interval History:     Patient Complaints: none  Patient Denies:  hypoglycemia  History taken from: patient    Review of Systems:   Review of Systems   Eyes: Negative for blurred vision.   Gastrointestinal: Negative for nausea.   Endocrine: Negative for polyuria.   Neurological: Negative for headache.     Gained 4 lb  since last visit    Objective     Vital Signs      Vitals:    08/19/20 0842   Temp: 98.5 °F (36.9 °C)         Physical Exam: limited due to video visit     General Appearance:    Alert, cooperative, in no acute distress   Head:    Normocephalic, without obvious abnormality, atraumatic   Eyes:            Lids and lashes normal, conjunctivae and sclerae normal, no   icterus, no pallor, corneas clear, PERRLA        Results Review:    I have reviewed the patient's new clinical results, labs & imaging.    Medication Review:   Prior to Admission medications    Medication Sig Start Date End Date Taking? Authorizing Provider   Cholecalciferol (VITAMIN D3) 125 MCG (5000 UT) capsule capsule Take 5,000 Units by mouth Daily.   Yes ProviderRick MD   glucagon (GLUCAGON EMERGENCY) 1 MG injection Inject 1 mg under the skin into the appropriate area as directed 1 (One) Time As Needed for Low Blood Sugar.   Yes Rick Ahuja MD   Glucose Blood (ELVIS CONTOUR TEST VI)  by In Vitro route. Use to check blood sugar before meals and at bedtime   Yes Rick Ahuja MD   insulin glargine (LANTUS) 100 UNIT/ML injection Inject  under the skin into the appropriate area as directed Daily. Use in case of pump failure 28 units daily   Yes Rick Ahuja MD   Magnesium 125 MG capsule Take  by mouth Daily.   Yes Rick Ahuja MD   Multiple Vitamins-Minerals (MULTIVITAMIN ADULT PO) Take  by mouth Daily.   Yes Rick Ahuja MD   norethindrone-ethinyl estradiol (NORTREL 1/35, 21,) 1-35 MG-MCG per tablet Take 1 tablet by mouth Daily. 1/21/20 1/20/21 Yes Iban Jacobs MD   NOVOLOG 100 UNIT/ML injection 1 unit per 8 grams carbs at breakfast, 1 unit per 10 carbs for lunch/supper max dose: 60 units per day use with the insulin pump dx:e10.65 7/19/19  Yes Dinesh Martin MD   Omega-3 Fatty Acids (FISH OIL) 1000 MG capsule capsule Take  by mouth Daily With Breakfast.   Yes Rick Ahuja MD   SYNTHROID 50 MCG tablet TAKE 1 TABLET DAILY 6/30/20  Yes Dinesh Martin MD       Lab Results (most recent)     None               Lab Results   Component Value Date    GLUCOSE 88 01/23/2018    BUN 9 01/23/2018    CREATININE 0.7 01/23/2018    BCR 12.9 01/23/2018    K 3.7 01/23/2018    CO2 26 01/23/2018    CALCIUM 9.0 01/23/2018    ALBUMIN 3.8 01/23/2018    LABIL2 1.2 01/23/2018    AST 26 01/23/2018    ALT 18 01/23/2018    CHOL 221 (H) 01/23/2018     (H) 01/23/2018    HDL 77 01/23/2018    TRIG 67 01/23/2018     A1C better @ 9.1% on 8/12/2020.    Assessment/Plan     Sandra was seen today for diabetes.    Diagnoses and all orders for this visit:    Type 1 diabetes mellitus with hyperglycemia (CMS/HCC)  -     Hemoglobin A1c; Future  -     Microalbumin / Creatinine Urine Ratio - Urine, Clean Catch; Future  -     Comprehensive Metabolic Panel; Future    Hypothyroidism due to Hashimoto's thyroiditis  -     Lipid Panel; Future  -     TSH; Future  -     T4, Free;  Future    Vitamin D deficiency  -     Vitamin D 25 Hydroxy; Future    Insulin pump status    Wearing a MiniMed 670G insulin pump since August 2018. Not using sensors.    Diabetes control better. Will check thyroid, lipids, vit D status next visit.    See eye doctor.  Continue exercise.  Continue thyroid replacement & vit D supplements.  Change ICR to 1:8.  Call if blood sugars are running under 100 or over 200.    Spent 25 min talking to pt.    Dinesh Martin MD  08/23/20  19:02

## 2020-08-25 RX ORDER — NORETHINDRONE AND ETHINYL ESTRADIOL 1 MG-35MCG
KIT ORAL
Qty: 84 TABLET | Refills: 4 | Status: SHIPPED | OUTPATIENT
Start: 2020-08-25 | End: 2021-01-27 | Stop reason: SDUPTHER

## 2020-10-02 DIAGNOSIS — E10.65 TYPE 1 DIABETES MELLITUS WITH HYPERGLYCEMIA (HCC): Primary | ICD-10-CM

## 2021-01-27 ENCOUNTER — OFFICE VISIT (OUTPATIENT)
Dept: OBSTETRICS AND GYNECOLOGY | Age: 31
End: 2021-01-27

## 2021-01-27 VITALS
SYSTOLIC BLOOD PRESSURE: 104 MMHG | WEIGHT: 166 LBS | HEIGHT: 65 IN | DIASTOLIC BLOOD PRESSURE: 68 MMHG | BODY MASS INDEX: 27.66 KG/M2

## 2021-01-27 DIAGNOSIS — Z01.419 ENCOUNTER FOR GYNECOLOGICAL EXAMINATION: Primary | ICD-10-CM

## 2021-01-27 DIAGNOSIS — Z30.09 GENERAL COUNSELING AND ADVICE FOR CONTRACEPTIVE MANAGEMENT: ICD-10-CM

## 2021-01-27 PROCEDURE — 99395 PREV VISIT EST AGE 18-39: CPT | Performed by: OBSTETRICS & GYNECOLOGY

## 2021-01-27 RX ORDER — NORETHINDRONE AND ETHINYL ESTRADIOL 1 MG-35MCG
1 KIT ORAL DAILY
Qty: 84 TABLET | Refills: 4 | Status: SHIPPED | OUTPATIENT
Start: 2021-01-27 | End: 2022-02-02 | Stop reason: SDUPTHER

## 2021-01-27 NOTE — PROGRESS NOTES
Subjective     Chief Complaint   Patient presents with   • Gynecologic Exam     Annual:Last pap 1/20,refill on ocp's         History of Present Illness      Sandra Davidson is a very pleasant  30 y.o. female who presents for annual exam.  Mammo Exam none, Contraception OCPs, Exercise 6 times a week including strength training and cardiovascular    Patient is very happy on the birth control pills cycles are well controlled she has no issues or side effects    Continues to use her insulin pump diabetes sugars have been up slightly but she is following that with her PCP.      Obstetric History:  OB History    No obstetric history on file.        Menstrual History:     Patient's last menstrual period was 12/27/2020 (approximate).       Sexual History:       Past Medical History:   Diagnosis Date   • ASCUS of cervix with negative high risk HPV    • Bacterial vaginitis    • Diabetes mellitus type I (CMS/HCC)    • Hypothyroidism    • IDM (infant of diabetic mother)    • Proteinuria      Past Surgical History:   Procedure Laterality Date   • TOOTH EXTRACTION     • WISDOM TOOTH EXTRACTION         SOCIAL Hx:      The following portions of the patient's history were reviewed and updated as appropriate: allergies, current medications, past family history, past medical history, past social history, past surgical history and problem list.    Review of Systems        Except as outlined in history of physical illness, patient denies any changes in her GYN, , GI systems.  All other systems reviewed were negative.         Current Outpatient Medications:   •  CBD oil (cannabidiol) capsule, Take  by mouth., Disp: , Rfl:   •  Cholecalciferol (VITAMIN D3) 125 MCG (5000 UT) capsule capsule, Take 5,000 Units by mouth Daily., Disp: , Rfl:   •  glucagon (GLUCAGON EMERGENCY) 1 MG injection, Inject 1 mg under the skin into the appropriate area as directed 1 (One) Time As Needed for Low Blood Sugar., Disp: , Rfl:   •  Glucose  "Blood (ELVIS CONTOUR TEST VI), by In Vitro route. Use to check blood sugar before meals and at bedtime, Disp: , Rfl:   •  insulin aspart (NovoLOG) 100 UNIT/ML injection, INJECT 1 UNIT PER 8 GRAMS OF  CARBOHYDRATES AT BREAKFAST, INJECT 1 UNIT PER 10 GRAMS OF CARBOHYDRATES FOR LUNCH AND SUPPER. (MAXIMUM DOSE = 60 UNITS PER DAY), Disp: 60 mL, Rfl: 3  •  insulin glargine (LANTUS) 100 UNIT/ML injection, Inject  under the skin into the appropriate area as directed Daily. Use in case of pump failure 28 units daily, Disp: , Rfl:   •  Magnesium 125 MG capsule, Take  by mouth Daily., Disp: , Rfl:   •  Multiple Vitamins-Minerals (MULTIVITAMIN ADULT PO), Take  by mouth Daily., Disp: , Rfl:   •  norethindrone-ethinyl estradiol (Alyacen 1/35) 1-35 MG-MCG per tablet, Take 1 tablet by mouth Daily., Disp: 84 tablet, Rfl: 4  •  Omega-3 Fatty Acids (FISH OIL) 1000 MG capsule capsule, Take  by mouth Daily With Breakfast., Disp: , Rfl:   •  SYNTHROID 50 MCG tablet, TAKE 1 TABLET DAILY, Disp: 90 tablet, Rfl: 3   Objective   Physical Exam    /68   Ht 165.1 cm (65\")   Wt 75.3 kg (166 lb)   LMP 12/27/2020 (Approximate)   Breastfeeding No   BMI 27.62 kg/m²     General: Patient is alert and oriented and appears overall healthy  Neck: Is supple without thyromegaly, no carotid bruits and no lymphadenopathy  Lungs: Clear bilaterally, no wheezing, rhonchi, or rales.  Respiratory rate is normal  Breast: Even, symmetrical, no lymphadenopathy, no retraction, no masses or cysts  Heart: Regular rate and rhythm are appreciated, no murmurs or rubs are heard  Abdomen: Is soft, without organomegaly, bowel sounds are positive, there is no                                rebound or guarding and palpation does not produce any discomfort  Back: Nontender without CVA tenderness  Pelvic: External genitalia appear normal and consistent with mature female.  BUS normal                            Vagina is clean dry without discharge and appears " adequately estrogenized, no               lesions or masses are present                         Cervix is noninflamed without discharge or lesions.  There is no cervical motion             tenderness.                Uterus is nonenlarged, without tenderness, and no masses or abnormalities are  present               Adnexa are non-enlarged, non tender               Rectal exam reveals adequate sphincter tone and no masses or lesions are                     appreciated on digital rectal examination.      Annual Well Woman Exam  Patient Active Problem List   Diagnosis   • IDM (infant of diabetic mother)   • Vomiting   • Type 1 diabetes mellitus with hyperglycemia (CMS/HCC)   • Type 1 diabetes (CMS/HCC)   • Itching in the vaginal area   • Insulin pump status   • Hypothyroidism   • Diabetes mellitus type I (CMS/HCC)   • Vitamin D deficiency                 Assessment/Plan   Diagnoses and all orders for this visit:    1. Encounter for gynecological examination (Primary)  -     IGP, Apt HPV,rfx 16 / 18,45    2. General counseling and advice for contraceptive management    Other orders  -     norethindrone-ethinyl estradiol (Alyacen 1/35) 1-35 MG-MCG per tablet; Take 1 tablet by mouth Daily.  Dispense: 84 tablet; Refill: 4      Discussed today's findings and concerns with patient.  Continue to recommend regular exercise including cardiovascular and resistance training as well as  breast self-exam. Wellness lab, mammography, & pap smear, in accordance with age guidelines.    I have encouraged her to call for today's test results if she has not received them within 10 days.  Patient is advised to call with any change in her condition or with any other questions, otherwise return  for annual examination.

## 2021-02-10 ENCOUNTER — TELEMEDICINE (OUTPATIENT)
Dept: ENDOCRINOLOGY | Facility: CLINIC | Age: 31
End: 2021-02-10

## 2021-02-10 VITALS — BODY MASS INDEX: 26.99 KG/M2 | WEIGHT: 162 LBS | HEIGHT: 65 IN

## 2021-02-10 DIAGNOSIS — E10.65 TYPE 1 DIABETES MELLITUS WITH HYPERGLYCEMIA (HCC): Primary | ICD-10-CM

## 2021-02-10 DIAGNOSIS — E06.3 HYPOTHYROIDISM DUE TO HASHIMOTO'S THYROIDITIS: ICD-10-CM

## 2021-02-10 DIAGNOSIS — E03.8 HYPOTHYROIDISM DUE TO HASHIMOTO'S THYROIDITIS: ICD-10-CM

## 2021-02-10 DIAGNOSIS — Z96.41 INSULIN PUMP STATUS: ICD-10-CM

## 2021-02-10 DIAGNOSIS — E55.9 VITAMIN D DEFICIENCY: ICD-10-CM

## 2021-02-10 PROCEDURE — 99214 OFFICE O/P EST MOD 30 MIN: CPT | Performed by: INTERNAL MEDICINE

## 2021-02-10 NOTE — PATIENT INSTRUCTIONS
Continue exercise.  Continue thyroid meds  & vit supplements.  Increase basal rate to 1.0 units/h from 12a-6a.  Call if blood sugars are running under 100 or over 200.  F/u in 6 months, with labs prior.

## 2021-02-13 NOTE — PROGRESS NOTES
[facility]        Patient Care Team:  Lara Rodríguez MD as PCP - General (Family Medicine)    Chief Complaint:    Chief Complaint   Patient presents with   • Diabetes     type 1, fbs 301, basal: 12a 0.850, 6a 0.900         Subjective   Here for diabetes f/u  This pt consented to this telemedicine visit in view of the covid 19 pandemic  Blood sugars: higher in am ( uploaded to [employer] )  Insulin delivery per pump: Basal 40% / bolus 60%  Exercise program: going to the gym 5d/week  Taking vit D    Interval History:     Patient Complaints: none  Patient Denies: hypoglycemia  History taken from: patient    Review of Systems:   Review of Systems   Eyes: Negative for blurred vision.   Gastrointestinal: Negative for nausea.   Endocrine: Negative for polyuria.   Neurological: Negative for headache.     Gained 4 lb since last visit    Objective     Vital Signs    There were no vitals filed for this visit.      Physical Exam: limited due to video visit     General Appearance:    Alert, cooperative, in no acute distress   Head:    Normocephalic, without obvious abnormality, atraumatic   Eyes:            Lids and lashes normal, conjunctivae and sclerae normal, no   icterus, no pallor, corneas clear, PERRLA        Results Review:    I have reviewed the patient's new clinical results, labs & imaging.    Medication Review:   Prior to Admission medications    Medication Sig Start Date End Date Taking? Authorizing Provider   CBD oil (cannabidiol) capsule Take  by mouth.   Yes Rick Ahuja MD   Cholecalciferol (VITAMIN D3) 125 MCG (5000 UT) capsule capsule Take 5,000 Units by mouth Daily.   Yes Rick Ahuja MD   glucagon (GLUCAGON EMERGENCY) 1 MG injection Inject 1 mg under the skin into the appropriate area as directed 1 (One) Time As Needed for Low Blood Sugar.   Yes Rick Ahuja MD   Glucose Blood (ELVIS CONTOUR TEST VI) by In Vitro route. Use to check blood sugar before meals  and at bedtime   Yes Rick Ahuja MD   insulin aspart (NovoLOG) 100 UNIT/ML injection INJECT 1 UNIT PER 8 GRAMS OF  CARBOHYDRATES AT BREAKFAST, INJECT 1 UNIT PER 10 GRAMS OF CARBOHYDRATES FOR LUNCH AND SUPPER. (MAXIMUM DOSE = 60 UNITS PER DAY)  Patient taking differently: In Pump (MAXIMUM DOSE = 60 UNITS PER DAY) 10/2/20  Yes Dinesh Martin MD   insulin glargine (LANTUS) 100 UNIT/ML injection Inject  under the skin into the appropriate area as directed Daily. Use in case of pump failure 28 units daily   Yes Rick Ahuja MD   Magnesium 125 MG capsule Take  by mouth Daily.   Yes Rick Ahuja MD   Multiple Vitamins-Minerals (MULTIVITAMIN ADULT PO) Take  by mouth Daily.   Yes Rick Ahuja MD   norethindrone-ethinyl estradiol (Alyacen 1/35) 1-35 MG-MCG per tablet Take 1 tablet by mouth Daily. 1/27/21  Yes Iban Jacobs MD   Omega-3 Fatty Acids (FISH OIL) 1000 MG capsule capsule Take  by mouth Daily With Breakfast.   Yes Rick Ahuja MD   SYNTHROID 50 MCG tablet TAKE 1 TABLET DAILY 6/30/20  Yes Dinesh Martin MD       Lab Results (most recent)     None        Lab Results   Component Value Date    GLUCOSE 88 01/23/2018    BUN 9 01/23/2018    CREATININE 0.7 01/23/2018    BCR 12.9 01/23/2018    K 3.7 01/23/2018    CO2 26 01/23/2018    CALCIUM 9.0 01/23/2018    ALBUMIN 3.8 01/23/2018    LABIL2 1.2 01/23/2018    AST 26 01/23/2018    ALT 18 01/23/2018    CHOL 221 (H) 01/23/2018     (H) 01/23/2018    HDL 77 01/23/2018    TRIG 67 01/23/2018     A1C higher @ 9.3%; microalb/cr ratio 5; cr 0.64, K 4.5, ALT 33; Chol 235, Trigl 78; TSH 1.82, Free T4 1.3; vit D level 48 on 2/1/2021.    Assessment/Plan     Diagnoses and all orders for this visit:    1. Type 1 diabetes mellitus with hyperglycemia (CMS/[facility]) (Primary) - worse  -     Hemoglobin A1c; Future    2. Hypothyroidism due to Hashimoto's thyroiditis - stable    3. Vitamin D deficiency - stable    4. Insulin pump  status    Wearing a MiniMed 670G insulin pump since August 2018. Not using sensors.    Continue exercise.  Continue thyroid meds  & vit supplements.  Increase basal rate to 1.0 units/h from 12a-6a.  Call if blood sugars are running under 100 or over 200.        Dinesh Martin MD  02/12/21  19:02 EST

## 2021-04-16 ENCOUNTER — BULK ORDERING (OUTPATIENT)
Dept: CASE MANAGEMENT | Facility: OTHER | Age: 31
End: 2021-04-16

## 2021-04-16 DIAGNOSIS — Z23 IMMUNIZATION DUE: ICD-10-CM

## 2021-06-14 RX ORDER — LEVOTHYROXINE SODIUM 50 MCG
TABLET ORAL
Qty: 90 TABLET | Refills: 3 | Status: SHIPPED | OUTPATIENT
Start: 2021-06-14 | End: 2022-06-01

## 2021-08-10 ENCOUNTER — TELEPHONE (OUTPATIENT)
Dept: ENDOCRINOLOGY | Facility: CLINIC | Age: 31
End: 2021-08-10

## 2021-08-11 ENCOUNTER — TELEMEDICINE (OUTPATIENT)
Dept: ENDOCRINOLOGY | Facility: CLINIC | Age: 31
End: 2021-08-11

## 2021-08-11 VITALS — WEIGHT: 155 LBS | BODY MASS INDEX: 25.83 KG/M2 | HEIGHT: 65 IN

## 2021-08-11 DIAGNOSIS — E55.9 VITAMIN D DEFICIENCY: ICD-10-CM

## 2021-08-11 DIAGNOSIS — E06.3 HYPOTHYROIDISM DUE TO HASHIMOTO'S THYROIDITIS: ICD-10-CM

## 2021-08-11 DIAGNOSIS — E03.8 HYPOTHYROIDISM DUE TO HASHIMOTO'S THYROIDITIS: ICD-10-CM

## 2021-08-11 DIAGNOSIS — E10.65 TYPE 1 DIABETES MELLITUS WITH HYPERGLYCEMIA (HCC): Primary | ICD-10-CM

## 2021-08-11 LAB — HBA1C MFR BLD: 9.7 %

## 2021-08-11 PROCEDURE — 99214 OFFICE O/P EST MOD 30 MIN: CPT | Performed by: INTERNAL MEDICINE

## 2021-08-11 NOTE — PROGRESS NOTES
[facility]        Patient Care Team:  Lara Rodríguez MD as PCP - General (Family Medicine)    Chief Complaint:    Chief Complaint   Patient presents with   • Diabetes     Type 1,  fasting, Basal 12am 1.0, 6am .9         Subjective   Here for diabetes f/u  This pt consented to this telemedicine visit in view of the covid 19 pandemic  Blood sugars: higher in the evening.  Not checking nor giving meal boluses regularly  Insulin delivery per pump: Basal 48% / bolus 51%    Exercise program: going to the gym 5d/week  Taking vit D    Interval History:     Patient Complaints: none  Patient Denies:  hypoglycemia  History taken from: patient    Review of Systems:   Review of Systems   Eyes: Negative for blurred vision.   Gastrointestinal: Negative for nausea.   Endocrine: Negative for polyuria.   Neurological: Negative for headache.     Lost  7 lb since last visit    Objective     Vital Signs    There were no vitals filed for this visit.      Physical Exam: limited due to video visit     General Appearance:    Alert, cooperative, in no acute distress   Head:    Normocephalic, without obvious abnormality, atraumatic   Eyes:            Lids and lashes normal, conjunctivae and sclerae normal, no   icterus, no pallor, corneas clear, PERRLA        Results Review:    I have reviewed the patient's new clinical results, labs & imaging.    Medication Review:   Prior to Admission medications    Medication Sig Start Date End Date Taking? Authorizing Provider   CBD oil (cannabidiol) capsule Take  by mouth.   Yes Rick Ahuja MD   Cholecalciferol (VITAMIN D3) 125 MCG (5000 UT) capsule capsule Take 5,000 Units by mouth Daily.   Yes Rick Ahuja MD   glucagon (GLUCAGON EMERGENCY) 1 MG injection Inject 1 mg under the skin into the appropriate area as directed 1 (One) Time As Needed for Low Blood Sugar.   Yes Rick Ahuja MD   Glucose Blood (ELVIS CONTOUR TEST VI) by In Vitro route.  Use to check blood sugar before meals and at bedtime   Yes Rick Ahuja MD   insulin aspart (NovoLOG) 100 UNIT/ML injection INJECT 1 UNIT PER 8 GRAMS OF  CARBOHYDRATES AT BREAKFAST, INJECT 1 UNIT PER 10 GRAMS OF CARBOHYDRATES FOR LUNCH AND SUPPER. (MAXIMUM DOSE = 60 UNITS PER DAY)  Patient taking differently: In Pump (MAXIMUM DOSE = 60 UNITS PER DAY) 10/2/20  Yes Dinesh Martin MD   insulin glargine (LANTUS) 100 UNIT/ML injection Inject  under the skin into the appropriate area as directed Daily. Use in case of pump failure 28 units daily   Yes Rick Ahuja MD   Magnesium 125 MG capsule Take  by mouth Daily.   Yes Rick Ahuja MD   Multiple Vitamins-Minerals (MULTIVITAMIN ADULT PO) Take  by mouth Daily.   Yes Rick Ahuja MD   norethindrone-ethinyl estradiol (Alyacen 1/35) 1-35 MG-MCG per tablet Take 1 tablet by mouth Daily. 1/27/21  Yes Iban Jacobs MD   Omega-3 Fatty Acids (FISH OIL) 1000 MG capsule capsule Take  by mouth Daily With Breakfast.   Yes Rick Ahuja MD   Synthroid 50 MCG tablet TAKE 1 TABLET DAILY 6/14/21  Yes Dinesh Martin MD       Lab Results (most recent)     Procedure Component Value Units Date/Time    Hemoglobin A1c [[id number]] Collected: 08/02/21    Specimen: Blood Updated: 08/11/21 0828     External Hemoglobin A1C 9.7        Lab Results   Component Value Date    HGBA1C 9.7 08/02/2021      Lab Results   Component Value Date    GLUCOSE 88 01/23/2018    BUN 9 01/23/2018    CREATININE 0.7 01/23/2018    BCR 12.9 01/23/2018    K 3.7 01/23/2018    CO2 26 01/23/2018    CALCIUM 9.0 01/23/2018    ALBUMIN 3.8 01/23/2018    LABIL2 1.2 01/23/2018    AST 26 01/23/2018    ALT 18 01/23/2018    CHOL 221 (H) 01/23/2018     (H) 01/23/2018    HDL 77 01/23/2018    TRIG 67 01/23/2018       Assessment/Plan     Diagnoses and all orders for this visit:    1. Type 1 diabetes mellitus with hyperglycemia (CMS/[facility]) (Primary)  -     Hemoglobin A1c; Future  -      Microalbumin / Creatinine Urine Ratio - Urine, Clean Catch; Future  -     Comprehensive Metabolic Panel; Future    2. Hypothyroidism due to Hashimoto's thyroiditis  -     Lipid Panel; Future  -     TSH; Future  -     T4, Free; Future    3. Vitamin D deficiency  -     Vitamin D 25 Hydroxy; Future    Wearing a MiniMed 670G insulin pump since August 2018. Not using sensors.    See eye as doctor as scheduled in September.  Continue exercise.  Continue same pump settings, Synthroid & vit D supplements.  Bolus for every meal.  Call if blood sugars are running under 100 or over 200.        Dinesh Martin MD  08/11/21  10:59 EDT

## 2021-08-11 NOTE — PATIENT INSTRUCTIONS
See eye as doctor as scheduled in September.  Continue exercise.  Continue same pump settings, Synthroid & vit D supplements.  Bolus for every meal.  Call if blood sugars are running under 100 or over 200.  F/u in 6 months, with fasting labs prior to appt.                                                                                                    labs prior.

## 2021-11-01 ENCOUNTER — TELEPHONE (OUTPATIENT)
Dept: ENDOCRINOLOGY | Facility: CLINIC | Age: 31
End: 2021-11-01

## 2022-01-26 DIAGNOSIS — E10.65 TYPE 1 DIABETES MELLITUS WITH HYPERGLYCEMIA: ICD-10-CM

## 2022-02-02 ENCOUNTER — OFFICE VISIT (OUTPATIENT)
Dept: OBSTETRICS AND GYNECOLOGY | Age: 32
End: 2022-02-02

## 2022-02-02 VITALS
HEIGHT: 65 IN | WEIGHT: 161 LBS | SYSTOLIC BLOOD PRESSURE: 120 MMHG | DIASTOLIC BLOOD PRESSURE: 60 MMHG | BODY MASS INDEX: 26.82 KG/M2

## 2022-02-02 DIAGNOSIS — Z30.09 GENERAL COUNSELING AND ADVICE FOR CONTRACEPTIVE MANAGEMENT: ICD-10-CM

## 2022-02-02 DIAGNOSIS — Z12.4 SCREENING FOR CERVICAL CANCER: ICD-10-CM

## 2022-02-02 DIAGNOSIS — Z01.419 ENCOUNTER FOR GYNECOLOGICAL EXAMINATION: Primary | ICD-10-CM

## 2022-02-02 PROCEDURE — 99395 PREV VISIT EST AGE 18-39: CPT | Performed by: OBSTETRICS & GYNECOLOGY

## 2022-02-02 RX ORDER — NORETHINDRONE AND ETHINYL ESTRADIOL 1 MG-35MCG
1 KIT ORAL DAILY
Qty: 84 TABLET | Refills: 4 | Status: SHIPPED | OUTPATIENT
Start: 2022-02-02 | End: 2023-02-13

## 2022-02-02 NOTE — PROGRESS NOTES
Subjective     Chief Complaint   Patient presents with   • Gynecologic Exam     Annual:Last pap 1/21,refill on ocp's         History of Present Illness      Sandra Davidson is a very pleasant  31 y.o. female who presents for annual exam.  Mammo Exam age 38, Contraception OCPs and seems to be doing well she likes the monophasic's., Exercise 5 times a week including cardio and resistance  Patient's managing her diabetes with her pump  Change in her birth control pills was effective  She declines any STD screening today    She continues to work at [employer] and has no questions or concerns.      Obstetric History:  OB History    No obstetric history on file.        Menstrual History:     Patient's last menstrual period was 01/26/2022 (exact date).       Sexual History:       Past Medical History:   Diagnosis Date   • ASCUS of cervix with negative high risk HPV    • Bacterial vaginitis    • Diabetes mellitus type I (HCC)    • History of shingles 12/01/2020   • Hypothyroidism    • IDM (infant of diabetic mother)    • Proteinuria      Past Surgical History:   Procedure Laterality Date   • TOOTH EXTRACTION     • WISDOM TOOTH EXTRACTION         SOCIAL Hx:      The following portions of the patient's history were reviewed and updated as appropriate: allergies, current medications, past family history, past medical history, past social history, past surgical history and problem list.    Review of Systems        Except as outlined in history of physical illness, patient denies any changes in her GYN, , GI systems.  All other systems reviewed were negative.         Current Outpatient Medications:   •  CBD oil (cannabidiol) capsule, Take  by mouth., Disp: , Rfl:   •  Cholecalciferol (VITAMIN D3) 125 MCG (5000 UT) capsule capsule, Take 5,000 Units by mouth Daily., Disp: , Rfl:   •  glucagon (GLUCAGON EMERGENCY) 1 MG injection, Inject 1 mg under the skin into the appropriate area as directed 1 (One) Time As Needed for  "Low Blood Sugar., Disp: , Rfl:   •  Glucose Blood (ELVIS CONTOUR TEST VI), by In Vitro route. Use to check blood sugar before meals and at bedtime, Disp: , Rfl:   •  insulin aspart (NovoLOG) 100 UNIT/ML injection, In Pump (MAXIMUM DOSE = 60 UNITS PER DAY), Disp: 60 mL, Rfl: 3  •  insulin glargine (LANTUS) 100 UNIT/ML injection, Inject  under the skin into the appropriate area as directed Daily. Use in case of pump failure 28 units daily, Disp: , Rfl:   •  Magnesium 125 MG capsule, Take  by mouth Daily., Disp: , Rfl:   •  Multiple Vitamins-Minerals (MULTIVITAMIN ADULT PO), Take  by mouth Daily., Disp: , Rfl:   •  norethindrone-ethinyl estradiol (Alyacen 1/35) 1-35 MG-MCG per tablet, Take 1 tablet by mouth Daily., Disp: 84 tablet, Rfl: 4  •  Omega-3 Fatty Acids (FISH OIL) 1000 MG capsule capsule, Take  by mouth Daily With Breakfast., Disp: , Rfl:   •  Synthroid 50 MCG tablet, TAKE 1 TABLET DAILY, Disp: 90 tablet, Rfl: 3   Objective   Physical Exam    /60   Ht 165.1 cm (65\")   Wt 73 kg (161 lb)   LMP 01/26/2022 (Exact Date)   Breastfeeding No   BMI 26.79 kg/m²     General: Patient is alert and oriented and appears overall healthy  Neck: Is supple without thyromegaly, no carotid bruits and no lymphadenopathy  Lungs: Clear bilaterally, no wheezing, rhonchi, or rales.  Respiratory rate is normal  Breast: Even, symmetrical, no lymphadenopathy, no retraction, no masses or cysts  Heart: Regular rate and rhythm are appreciated, no murmurs or rubs are heard  Abdomen: Is soft, without organomegaly, bowel sounds are positive, there is no rebound or guarding and palpation does not produce any discomfort  Back: Nontender without CVA tenderness  Pelvic: External genitalia appear normal and consistent with mature female.  BUS normal                            Vagina is clean dry without discharge and appears adequately estrogenized, no lesions or masses are present                         Cervix is noninflamed without " discharge or lesions.  There is no cervical motion tenderness.                Uterus is nonenlarged, without tenderness, and no masses or abnormalities are  present               Adnexa are non-enlarged, non tender               Rectal exam reveals adequate sphincter tone and no masses or lesions are appreciated on digital rectal examination.      Annual Well Woman Exam  Patient Active Problem List   Diagnosis   • IDM (infant of diabetic mother)   • Vomiting   • Type 1 diabetes mellitus with hyperglycemia (HCC)   • Type 1 diabetes (HCC)   • Itching in the vaginal area   • Insulin pump status   • Hypothyroidism   • Diabetes mellitus type I (HCC)   • Vitamin D deficiency                 Assessment/Plan   Diagnoses and all orders for this visit:    1. Encounter for gynecological examination (Primary)  -     IGP, Apt HPV,rfx 16 / 18,45    2. Screening for cervical cancer    3. General counseling and advice for contraceptive management    Other orders  -     norethindrone-ethinyl estradiol (Alyacen 1/35) 1-35 MG-MCG per tablet; Take 1 tablet by mouth Daily.  Dispense: 84 tablet; Refill: 4      Discussed today's findings and concerns with patient.  Continue to recommend regular exercise including cardiovascular and resistance training as well as  breast self-exam. Wellness lab, mammography, & pap smear, in accordance with age guidelines.    I have encouraged her to call for today's test results if she has not received them within 10 days.  Patient is advised to call with any change in her condition or with any other questions, otherwise return  for annual examination.

## 2022-02-16 ENCOUNTER — OFFICE VISIT (OUTPATIENT)
Dept: ENDOCRINOLOGY | Facility: CLINIC | Age: 32
End: 2022-02-16

## 2022-02-16 VITALS
BODY MASS INDEX: 27.63 KG/M2 | DIASTOLIC BLOOD PRESSURE: 64 MMHG | HEART RATE: 78 BPM | HEIGHT: 65 IN | WEIGHT: 165.8 LBS | SYSTOLIC BLOOD PRESSURE: 120 MMHG | TEMPERATURE: 97.3 F

## 2022-02-16 DIAGNOSIS — E06.3 HYPOTHYROIDISM DUE TO HASHIMOTO'S THYROIDITIS: ICD-10-CM

## 2022-02-16 DIAGNOSIS — E10.65 TYPE 1 DIABETES MELLITUS WITH HYPERGLYCEMIA: Primary | ICD-10-CM

## 2022-02-16 DIAGNOSIS — Z96.41 INSULIN PUMP STATUS: ICD-10-CM

## 2022-02-16 DIAGNOSIS — E55.9 VITAMIN D DEFICIENCY: ICD-10-CM

## 2022-02-16 DIAGNOSIS — E03.8 HYPOTHYROIDISM DUE TO HASHIMOTO'S THYROIDITIS: ICD-10-CM

## 2022-02-16 LAB — GLUCOSE BLDC GLUCOMTR-MCNC: 277 MG/DL (ref 70–105)

## 2022-02-16 PROCEDURE — 82962 GLUCOSE BLOOD TEST: CPT | Performed by: INTERNAL MEDICINE

## 2022-02-16 PROCEDURE — 99214 OFFICE O/P EST MOD 30 MIN: CPT | Performed by: INTERNAL MEDICINE

## 2022-02-16 NOTE — PATIENT INSTRUCTIONS
Jenna pump preview class, 2nd part.  Continue exercise.  Continue thyroid meds & vit D supplements.  Increase basal rate to 1.1 units/h from 12a-6a.  Call if blood sugars are running under 100 or over 200.  F/u in 6 months, with labs prior.

## 2022-02-21 NOTE — PROGRESS NOTES
[facility]        Patient Care Team:  Lara Rodríguez MD as PCP - General (Family Medicine)    Chief Complaint:    Chief Complaint   Patient presents with   • Diabetes     FU DM1 BS-277 1hpp; basal rates: 12a 1.0, 6a 0.9 units/h         Subjective   Here for diabetes f/u  Blood sugars: in the low 200's  Insulin delivery per pump: Basal 41% / bolus 59%  Exercise program: going to the gym 5d/week  Taking vit D    Interval History:     Patient Complaints: none  Patient Denies: hypoglycemia  History taken from: patient    Review of Systems:   Review of Systems   Constitutional: Positive for unexpected weight gain.   HENT: Negative for trouble swallowing.    Eyes: Negative for blurred vision.   Gastrointestinal: Negative for nausea.   Endocrine: Negative for polyuria.   Neurological: Negative for headache.     Gained 10 lb since last visit    Objective     Vital Signs      Vitals:    02/16/22 0822   BP: 120/64   Pulse: 78   Temp: 97.3 °F (36.3 °C)         Physical Exam:     General Appearance:    Alert, cooperative, in no acute distress   Head:    Normocephalic, without obvious abnormality, atraumatic   Eyes:            Lids and lashes normal, conjunctivae and sclerae normal, no   icterus, no pallor, corneas clear, PERRLA   Throat:   No oral lesions,  oral mucosa moist   Neck:   No adenopathy, supple,  no thyromegaly, no   carotid bruit   Lungs:     Clear    Heart:    Regular rhythm and normal rate   Chest Wall:    No abnormalities observed   Abdomen:     Normal bowel sounds, soft                 Extremities:   Moves all extremities well, no edema               Pulses:   Pulses palpable and equal bilaterally   Skin:   Dry. No bruising or rash   Neurologic:  DTR 1+ in ankles, normal vibratory snese, able to feel the 10g monofilament          Results Review:    I have reviewed the patient's new clinical results, labs & imaging.    Medication Review:   Prior to Admission medications    Medication  Sig Start Date End Date Taking? Authorizing Provider   CBD oil (cannabidiol) capsule Take  by mouth.   Yes Rick Ahuja MD   Cholecalciferol (VITAMIN D3) 125 MCG (5000 UT) capsule capsule Take 5,000 Units by mouth Daily.   Yes Rick Ahuja MD   glucagon (GLUCAGON EMERGENCY) 1 MG injection Inject 1 mg under the skin into the appropriate area as directed 1 (One) Time As Needed for Low Blood Sugar.   Yes Rick Ahuja MD   Glucose Blood (ELVIS CONTOUR TEST VI) by In Vitro route. Use to check blood sugar before meals and at bedtime   Yes Rick Ahuja MD   insulin aspart (NovoLOG) 100 UNIT/ML injection In Pump (MAXIMUM DOSE = 60 UNITS PER DAY) 1/26/22  Yes Dinesh Martin MD   insulin glargine (LANTUS) 100 UNIT/ML injection Inject  under the skin into the appropriate area as directed Daily. Use in case of pump failure 28 units daily   Yes Rick Ahuja MD   Magnesium 125 MG capsule Take  by mouth Daily.   Yes Rick Ahuja MD   Multiple Vitamins-Minerals (MULTIVITAMIN ADULT PO) Take  by mouth Daily.   Yes Rick Ahuja MD   norethindrone-ethinyl estradiol (Alyacen 1/35) 1-35 MG-MCG per tablet Take 1 tablet by mouth Daily. 2/2/22  Yes Iban Jacobs MD   Omega-3 Fatty Acids (FISH OIL) 1000 MG capsule capsule Take  by mouth Daily With Breakfast.   Yes Rick Ahuja MD   Synthroid 50 MCG tablet TAKE 1 TABLET DAILY 6/14/21  Yes Dinesh Martin MD       Lab Results (most recent)     Procedure Component Value Units Date/Time    POC Glucose [[id number]]  (Abnormal) Collected: 02/16/22 0827    Specimen: Blood Updated: 02/16/22 0828     Glucose 277 mg/dL      Comment: Serial Number: [id number]:  [id number]           Lab Results   Component Value Date    HGBA1C 9.7 08/02/2021      Lab Results   Component Value Date    GLUCOSE 88 01/23/2018    BUN 9 01/23/2018    CREATININE 0.7 01/23/2018    BCR 12.9 01/23/2018    K 3.7 01/23/2018    CO2 26 01/23/2018     CALCIUM 9.0 01/23/2018    ALBUMIN 3.8 01/23/2018    LABIL2 1.2 01/23/2018    AST 26 01/23/2018    ALT 18 01/23/2018    CHOL 221 (H) 01/23/2018     (H) 01/23/2018    HDL 77 01/23/2018    TRIG 67 01/23/2018     Alb/cr ratio 13; cr 0.78, K 4.5, ALT 16; TSH 2.98, FreeT4 1.58;  Chol 185, Trigl 57, , HDL 72; vit D level 52.1 on 2/7/2022.    Assessment/Plan     Diagnoses and all orders for this visit:    1. Type 1 diabetes mellitus with hyperglycemia (HCC) (Primary) - stable, not at goal  -     Ambulatory Referral to Diabetic Education  -     Hemoglobin A1c; Future    2. Hypothyroidism due to Hashimoto's thyroiditis - stable    3. Vitamin D deficiency - stable  -     Vitamin D 25 Hydroxy; Future    4. Insulin pump status    Other orders  -     POC Glucose    Wearing a MiniMed 670G insulin pump since August 2018. Not using sensors.    Jenna pump preview class, 2nd part.  Continue exercise.  Continue thyroid meds & vit D supplements.  Increase basal rate to 1.1 units/h from 12a-6a.  Call if blood sugars are running under 100 or over 200.        Dinesh Martin MD  02/20/22  20:12 EST

## 2022-03-24 ENCOUNTER — OFFICE VISIT (OUTPATIENT)
Dept: ENDOCRINOLOGY | Facility: CLINIC | Age: 32
End: 2022-03-24

## 2022-03-24 DIAGNOSIS — E10.65 TYPE 1 DIABETES MELLITUS WITH HYPERGLYCEMIA: ICD-10-CM

## 2022-03-24 PROCEDURE — G0109 DIAB MANAGE TRN IND/GROUP: HCPCS | Performed by: DIETITIAN, REGISTERED

## 2022-03-24 NOTE — PROGRESS NOTES
Put in a no charge for patient. She was only present to see pump reps.  Pt wants to try to get the tslim x 2. Faxed AOB, insurance cards, last OV and face sheet to [facility]. Sent Dexcom rx to pharmacy and called pt and LVM about this.

## 2022-03-25 RX ORDER — PROCHLORPERAZINE 25 MG/1
SUPPOSITORY RECTAL
COMMUNITY
End: 2022-03-25 | Stop reason: SDUPTHER

## 2022-03-25 RX ORDER — PROCHLORPERAZINE 25 MG/1
SUPPOSITORY RECTAL
Qty: 3 EACH | Refills: 6 | Status: SHIPPED | OUTPATIENT
Start: 2022-03-25 | End: 2022-10-28 | Stop reason: SDUPTHER

## 2022-03-25 RX ORDER — PROCHLORPERAZINE 25 MG/1
1 SUPPOSITORY RECTAL CONTINUOUS
Qty: 1 EACH | Refills: 3 | Status: SHIPPED | OUTPATIENT
Start: 2022-03-25 | End: 2022-10-28 | Stop reason: SDUPTHER

## 2022-03-25 RX ORDER — PROCHLORPERAZINE 25 MG/1
1 SUPPOSITORY RECTAL CONTINUOUS
Qty: 1 EACH | Refills: 0 | Status: SHIPPED | OUTPATIENT
Start: 2022-03-25 | End: 2022-10-28 | Stop reason: SDUPTHER

## 2022-06-01 RX ORDER — LEVOTHYROXINE SODIUM 50 MCG
TABLET ORAL
Qty: 90 TABLET | Refills: 3 | Status: SHIPPED | OUTPATIENT
Start: 2022-06-01 | End: 2022-10-28 | Stop reason: SDUPTHER

## 2022-08-02 ENCOUNTER — TELEPHONE (OUTPATIENT)
Dept: ENDOCRINOLOGY | Facility: CLINIC | Age: 32
End: 2022-08-02

## 2022-08-09 ENCOUNTER — TELEPHONE (OUTPATIENT)
Dept: ENDOCRINOLOGY | Facility: CLINIC | Age: 32
End: 2022-08-09

## 2022-08-11 ENCOUNTER — TELEPHONE (OUTPATIENT)
Dept: ENDOCRINOLOGY | Facility: CLINIC | Age: 32
End: 2022-08-11

## 2022-08-11 NOTE — TELEPHONE ENCOUNTER
Attempted to call pt but had to LVM. Pt may need to check with her insurance to see if Dexcom supplies have to go through a DME company instead.

## 2022-08-11 NOTE — TELEPHONE ENCOUNTER
PA Dexcom Transmitter done in covermymeds. PA Case ID: [id number]    Denied. Drug not covered/plan exclusion. Not a pharmacy benefit.

## 2022-08-23 NOTE — TELEPHONE ENCOUNTER
Attempted to call pt again but had to [facility] to call back Renetta. Recv'd a request from [facility] for a PA (key [id number]) on Dexcom Transmitter. When I try to enter in key in covermymeds, it says no matching record.

## 2022-10-05 ENCOUNTER — TELEPHONE (OUTPATIENT)
Dept: ENDOCRINOLOGY | Facility: CLINIC | Age: 32
End: 2022-10-05

## 2022-10-05 DIAGNOSIS — E10.65 TYPE 1 DIABETES MELLITUS WITH HYPERGLYCEMIA: Primary | ICD-10-CM

## 2022-10-05 DIAGNOSIS — E03.8 HYPOTHYROIDISM DUE TO HASHIMOTO'S THYROIDITIS: ICD-10-CM

## 2022-10-05 DIAGNOSIS — E06.3 HYPOTHYROIDISM DUE TO HASHIMOTO'S THYROIDITIS: ICD-10-CM

## 2022-10-05 DIAGNOSIS — E55.9 VITAMIN D DEFICIENCY: ICD-10-CM

## 2022-10-05 NOTE — TELEPHONE ENCOUNTER
Patient called and states she had to reschedule her September visit out to March due to insurance issue. She is asking when she comes for labs in march do you just want the Vit D and A1C still or do you want her to have thyroid, lipid, and other labs checked again (last done Feb 2022). Please advise

## 2022-10-28 DIAGNOSIS — E10.65 TYPE 1 DIABETES MELLITUS WITH HYPERGLYCEMIA: ICD-10-CM

## 2022-10-28 RX ORDER — PROCHLORPERAZINE 25 MG/1
1 SUPPOSITORY RECTAL CONTINUOUS
Qty: 1 EACH | Refills: 3 | Status: SHIPPED | OUTPATIENT
Start: 2022-10-28

## 2022-10-28 RX ORDER — PROCHLORPERAZINE 25 MG/1
1 SUPPOSITORY RECTAL CONTINUOUS
Qty: 1 EACH | Refills: 0 | Status: SHIPPED | OUTPATIENT
Start: 2022-10-28 | End: 2023-01-10

## 2022-10-28 RX ORDER — INSULIN LISPRO 100 [IU]/ML
INJECTION, SOLUTION INTRAVENOUS; SUBCUTANEOUS
Qty: 60 ML | Refills: 3 | Status: SHIPPED | OUTPATIENT
Start: 2022-10-28 | End: 2023-03-13 | Stop reason: SDUPTHER

## 2022-10-28 RX ORDER — PROCHLORPERAZINE 25 MG/1
SUPPOSITORY RECTAL
Qty: 9 EACH | Refills: 3 | Status: SHIPPED | OUTPATIENT
Start: 2022-10-28

## 2022-10-28 RX ORDER — LEVOTHYROXINE SODIUM 0.05 MG/1
50 TABLET ORAL DAILY
Qty: 90 TABLET | Refills: 1 | Status: SHIPPED | OUTPATIENT
Start: 2022-10-28

## 2022-11-03 DIAGNOSIS — E10.65 TYPE 1 DIABETES MELLITUS WITH HYPERGLYCEMIA: Primary | ICD-10-CM

## 2022-11-03 RX ORDER — INSULIN ASPART 100 [IU]/ML
INJECTION, SOLUTION INTRAVENOUS; SUBCUTANEOUS
Qty: 60 ML | Refills: 3 | Status: SHIPPED | OUTPATIENT
Start: 2022-11-03 | End: 2023-03-13

## 2022-11-04 RX ORDER — HUMAN INSULIN 100 [IU]/ML
INJECTION, SOLUTION SUBCUTANEOUS
Refills: 12 | Status: CANCELLED | OUTPATIENT
Start: 2022-11-04

## 2022-11-07 ENCOUNTER — OFFICE VISIT (OUTPATIENT)
Dept: ENDOCRINOLOGY | Facility: CLINIC | Age: 32
End: 2022-11-07

## 2022-11-07 DIAGNOSIS — E10.65 TYPE 1 DIABETES MELLITUS WITH HYPERGLYCEMIA: ICD-10-CM

## 2022-11-10 ENCOUNTER — TELEPHONE (OUTPATIENT)
Dept: ENDOCRINOLOGY | Facility: CLINIC | Age: 32
End: 2022-11-10

## 2023-01-08 DIAGNOSIS — E10.65 TYPE 1 DIABETES MELLITUS WITH HYPERGLYCEMIA: ICD-10-CM

## 2023-01-10 RX ORDER — PROCHLORPERAZINE 25 MG/1
SUPPOSITORY RECTAL
Qty: 1 EACH | Refills: 2 | Status: SHIPPED | OUTPATIENT
Start: 2023-01-10

## 2023-02-13 ENCOUNTER — OFFICE VISIT (OUTPATIENT)
Dept: OBSTETRICS AND GYNECOLOGY | Age: 33
End: 2023-02-13
Payer: COMMERCIAL

## 2023-02-13 VITALS
BODY MASS INDEX: 30.49 KG/M2 | WEIGHT: 183 LBS | DIASTOLIC BLOOD PRESSURE: 70 MMHG | HEIGHT: 65 IN | SYSTOLIC BLOOD PRESSURE: 118 MMHG

## 2023-02-13 DIAGNOSIS — Z00.00 ENCOUNTER FOR ANNUAL PHYSICAL EXAM: ICD-10-CM

## 2023-02-13 DIAGNOSIS — E10.65 TYPE 1 DIABETES MELLITUS WITH HYPERGLYCEMIA: Primary | ICD-10-CM

## 2023-02-13 DIAGNOSIS — Z12.4 SCREENING FOR CERVICAL CANCER: ICD-10-CM

## 2023-02-13 PROCEDURE — 99395 PREV VISIT EST AGE 18-39: CPT | Performed by: OBSTETRICS & GYNECOLOGY

## 2023-02-13 NOTE — PROGRESS NOTES
Subjective     Chief Complaint   Patient presents with   • Gynecologic Exam     AE, last pap 02/02/2022 neg/hpv neg         History of Present Illness    Wellness exam  Sandra Davidson is a very pleasant  32 y.o. female who presents for annual exam.  Mammo Exam next year's annual exam, Contraception condoms, Exercise yes  Patient without GYN concerns or complaints today.    She changed her insulin pump seems to have much better control.    She has a new job still with some international travel    .      Obstetric History:  OB History    No obstetric history on file.        Menstrual History:     Patient's last menstrual period was 02/07/2023.       Sexual History:       Past Medical History:   Diagnosis Date   • ASCUS of cervix with negative high risk HPV    • Bacterial vaginitis    • Diabetes mellitus type I (HCC)    • History of shingles 12/01/2020   • Hypothyroidism    • IDM (infant of diabetic mother)    • Proteinuria      Past Surgical History:   Procedure Laterality Date   • TOOTH EXTRACTION     • WISDOM TOOTH EXTRACTION         SOCIAL Hx:      The following portions of the patient's history were reviewed and updated as appropriate: allergies, current medications, past family history, past medical history, past social history, past surgical history and problem list.    Review of Systems        Except as outlined in history of physical illness, patient denies any changes in her GYN, , GI systems.  All other systems reviewed were negative.         Current Outpatient Medications:   •  CBD oil (cannabidiol) capsule, Take  by mouth., Disp: , Rfl:   •  Cholecalciferol (VITAMIN D3) 125 MCG (5000 UT) capsule capsule, Take 5,000 Units by mouth Daily., Disp: , Rfl:   •  Continuous Blood Gluc  (Dexcom G6 ) device, USE CONTINUOUS TO MONITOR  BLOOD SUGARS, Disp: 1 each, Rfl: 2  •  Continuous Blood Gluc Sensor (Dexcom G6 Sensor), Pt to use to monitor her blood sugars continuously.  Pt to  "replace sensor every 10 days, Disp: 9 each, Rfl: 3  •  Continuous Blood Gluc Transmit (Dexcom G6 Transmitter) misc, 1 each Continuous. Pt to use to monitor her blood sugars continuously.  Pt to replace transmitter every 90 days, Disp: 1 each, Rfl: 3  •  glucagon (GLUCAGEN) 1 MG injection, Inject 1 mg under the skin into the appropriate area as directed 1 (One) Time As Needed for Low Blood Sugar., Disp: , Rfl:   •  Glucose Blood (ELVIS CONTOUR TEST VI), by In Vitro route. Use to check blood sugar before meals and at bedtime, Disp: , Rfl:   •  Insulin Aspart (novoLOG) 100 UNIT/ML injection, For use In Pump (MAXIMUM DOSE = 60 UNITS PER DAY, Disp: 60 mL, Rfl: 3  •  insulin glargine (LANTUS, SEMGLEE) 100 UNIT/ML injection, Inject  under the skin into the appropriate area as directed Daily. Use in case of pump failure 28 units daily, Disp: , Rfl:   •  Insulin Lispro (HumaLOG) 100 UNIT/ML injection, For use In Pump (MAXIMUM DOSE = 60 UNITS PER DAY), Disp: 60 mL, Rfl: 3  •  levothyroxine (Synthroid) 50 MCG tablet, Take 1 tablet by mouth Daily., Disp: 90 tablet, Rfl: 1  •  Magnesium 125 MG capsule, Take  by mouth Daily., Disp: , Rfl:   •  Multiple Vitamins-Minerals (MULTIVITAMIN ADULT PO), Take  by mouth Daily., Disp: , Rfl:   •  Omega-3 Fatty Acids (FISH OIL) 1000 MG capsule capsule, Take  by mouth Daily With Breakfast., Disp: , Rfl:    Objective   Physical Exam    /70   Ht 165.1 cm (65\")   Wt 83 kg (183 lb)   LMP 02/07/2023   BMI 30.45 kg/m²     General: Patient is alert and oriented and appears overall healthy  Neck: Is supple without thyromegaly, no carotid bruits and no lymphadenopathy  Lungs: Clear bilaterally, no wheezing, rhonchi, or rales.  Respiratory rate is normal  Breast: Even, symmetrical, no lymphadenopathy, no retraction, no masses or cysts  Heart: Regular rate and rhythm are appreciated, no murmurs or rubs are heard  Abdomen: Is soft, without organomegaly, bowel sounds are positive, there is no " rebound or guarding and palpation does not produce any discomfort  Back: Nontender without CVA tenderness  Pelvic: External genitalia appear normal and consistent with mature female.  BUS normal                            Vagina is clean dry without discharge and appears adequately estrogenized, no lesions or masses are present                         Cervix is noninflamed without discharge or lesions.  There is no cervical motion tenderness.                Uterus is nonenlarged, without tenderness, and no masses or abnormalities are  present               Adnexa are non-enlarged, non tender               Rectal exam reveals adequate sphincter tone and no masses or lesions are appreciated on digital rectal examination.      Annual Well Woman Exam  Patient Active Problem List   Diagnosis   • IDM (infant of diabetic mother)   • Vomiting   • Type 1 diabetes mellitus with hyperglycemia (HCC)   • Type 1 diabetes (HCC)   • Itching in the vaginal area   • Insulin pump status   • Hypothyroidism   • Diabetes mellitus type I (HCC)   • Vitamin D deficiency                 Assessment & Plan   Diagnoses and all orders for this visit:    1. Type 1 diabetes mellitus with hyperglycemia (HCC) (Primary)    2. Screening for cervical cancer    3. Encounter for annual physical exam  -     IGP, Apt HPV,rfx 16 / 18,45    Mammogram with next years annual exam      Discussed today's findings and concerns with patient.  Continue to recommend regular exercise including cardiovascular and resistance training as well as  breast self-exam. Wellness lab, mammography, & pap smear, in accordance with age guidelines.    I have encouraged her to call for today's test results if she has not received them within 10 days.  Patient is advised to call with any change in her condition or with any other questions, otherwise return  for annual examination.

## 2023-03-06 ENCOUNTER — LAB (OUTPATIENT)
Dept: LAB | Facility: HOSPITAL | Age: 33
End: 2023-03-06
Payer: COMMERCIAL

## 2023-03-06 DIAGNOSIS — E03.8 HYPOTHYROIDISM DUE TO HASHIMOTO'S THYROIDITIS: ICD-10-CM

## 2023-03-06 DIAGNOSIS — E55.9 VITAMIN D DEFICIENCY: ICD-10-CM

## 2023-03-06 DIAGNOSIS — E06.3 HYPOTHYROIDISM DUE TO HASHIMOTO'S THYROIDITIS: ICD-10-CM

## 2023-03-06 DIAGNOSIS — E10.65 TYPE 1 DIABETES MELLITUS WITH HYPERGLYCEMIA: ICD-10-CM

## 2023-03-06 LAB
25(OH)D3 SERPL-MCNC: 27.9 NG/ML (ref 30–100)
ALBUMIN SERPL-MCNC: 4.2 G/DL (ref 3.5–5.2)
ALBUMIN UR-MCNC: <1.2 MG/DL
ALBUMIN/GLOB SERPL: 1.4 G/DL
ALP SERPL-CCNC: 51 U/L (ref 39–117)
ALT SERPL W P-5'-P-CCNC: 14 U/L (ref 1–33)
ANION GAP SERPL CALCULATED.3IONS-SCNC: 7.6 MMOL/L (ref 5–15)
AST SERPL-CCNC: 17 U/L (ref 1–32)
BILIRUB SERPL-MCNC: 0.7 MG/DL (ref 0–1.2)
BUN SERPL-MCNC: 11 MG/DL (ref 6–20)
BUN/CREAT SERPL: 19 (ref 7–25)
CALCIUM SPEC-SCNC: 9.4 MG/DL (ref 8.6–10.5)
CHLORIDE SERPL-SCNC: 102 MMOL/L (ref 98–107)
CHOLEST SERPL-MCNC: 200 MG/DL (ref 0–200)
CO2 SERPL-SCNC: 28.4 MMOL/L (ref 22–29)
CREAT SERPL-MCNC: 0.58 MG/DL (ref 0.57–1)
CREAT UR-MCNC: 104.7 MG/DL
EGFRCR SERPLBLD CKD-EPI 2021: 122 ML/MIN/1.73
GLOBULIN UR ELPH-MCNC: 2.9 GM/DL
GLUCOSE SERPL-MCNC: 117 MG/DL (ref 65–99)
HBA1C MFR BLD: 7.7 % (ref 4.8–5.6)
HDLC SERPL-MCNC: 66 MG/DL (ref 40–60)
LDLC SERPL CALC-MCNC: 125 MG/DL (ref 0–100)
LDLC/HDLC SERPL: 1.88 {RATIO}
MICROALBUMIN/CREAT UR: NORMAL MG/G{CREAT}
POTASSIUM SERPL-SCNC: 4.1 MMOL/L (ref 3.5–5.2)
PROT SERPL-MCNC: 7.1 G/DL (ref 6–8.5)
SODIUM SERPL-SCNC: 138 MMOL/L (ref 136–145)
T4 FREE SERPL-MCNC: 1.18 NG/DL (ref 0.93–1.7)
TRIGL SERPL-MCNC: 49 MG/DL (ref 0–150)
TSH SERPL DL<=0.05 MIU/L-ACNC: 2.97 UIU/ML (ref 0.27–4.2)
VLDLC SERPL-MCNC: 9 MG/DL (ref 5–40)

## 2023-03-06 PROCEDURE — 80053 COMPREHEN METABOLIC PANEL: CPT

## 2023-03-06 PROCEDURE — 80061 LIPID PANEL: CPT

## 2023-03-06 PROCEDURE — 82043 UR ALBUMIN QUANTITATIVE: CPT

## 2023-03-06 PROCEDURE — 82306 VITAMIN D 25 HYDROXY: CPT

## 2023-03-06 PROCEDURE — 82570 ASSAY OF URINE CREATININE: CPT

## 2023-03-06 PROCEDURE — 83036 HEMOGLOBIN GLYCOSYLATED A1C: CPT

## 2023-03-06 PROCEDURE — 36415 COLL VENOUS BLD VENIPUNCTURE: CPT

## 2023-03-06 PROCEDURE — 84443 ASSAY THYROID STIM HORMONE: CPT

## 2023-03-06 PROCEDURE — 84439 ASSAY OF FREE THYROXINE: CPT

## 2023-03-13 ENCOUNTER — OFFICE VISIT (OUTPATIENT)
Dept: ENDOCRINOLOGY | Facility: CLINIC | Age: 33
End: 2023-03-13
Payer: COMMERCIAL

## 2023-03-13 VITALS
SYSTOLIC BLOOD PRESSURE: 112 MMHG | HEIGHT: 65 IN | WEIGHT: 180.78 LBS | HEART RATE: 72 BPM | BODY MASS INDEX: 30.12 KG/M2 | OXYGEN SATURATION: 100 % | DIASTOLIC BLOOD PRESSURE: 78 MMHG

## 2023-03-13 DIAGNOSIS — E55.9 VITAMIN D DEFICIENCY: ICD-10-CM

## 2023-03-13 DIAGNOSIS — E06.3 HYPOTHYROIDISM DUE TO HASHIMOTO'S THYROIDITIS: ICD-10-CM

## 2023-03-13 DIAGNOSIS — E10.65 TYPE 1 DIABETES MELLITUS WITH HYPERGLYCEMIA: Primary | ICD-10-CM

## 2023-03-13 DIAGNOSIS — Z96.41 INSULIN PUMP STATUS: ICD-10-CM

## 2023-03-13 DIAGNOSIS — E03.8 HYPOTHYROIDISM DUE TO HASHIMOTO'S THYROIDITIS: ICD-10-CM

## 2023-03-13 PROCEDURE — 99214 OFFICE O/P EST MOD 30 MIN: CPT | Performed by: INTERNAL MEDICINE

## 2023-03-13 RX ORDER — INSULIN LISPRO 100 [IU]/ML
INJECTION, SOLUTION INTRAVENOUS; SUBCUTANEOUS
Qty: 80 ML | Refills: 3 | Status: SHIPPED | OUTPATIENT
Start: 2023-03-13 | End: 2023-03-17 | Stop reason: CLARIF

## 2023-03-13 NOTE — PATIENT INSTRUCTIONS
Keep up the good work!  Continue exercise.  Bolus before eating.  Take vit D daily.  Call if blood sugars are running under 100 or over 200.  F/u in 6 months, with labs prior.

## 2023-03-17 RX ORDER — INSULIN ASPART 100 [IU]/ML
INJECTION, SOLUTION INTRAVENOUS; SUBCUTANEOUS
Refills: 12
Start: 2023-03-17 | End: 2023-03-20 | Stop reason: SDUPTHER

## 2023-03-17 NOTE — PROGRESS NOTES
Patient notified of the change from Humalog to Novolog due to formulary constraints. She verbalized understanding.

## 2023-03-20 DIAGNOSIS — E10.65 TYPE 1 DIABETES MELLITUS WITH HYPERGLYCEMIA: Primary | ICD-10-CM

## 2023-03-20 RX ORDER — INSULIN ASPART 100 [IU]/ML
INJECTION, SOLUTION INTRAVENOUS; SUBCUTANEOUS
Qty: 80 ML | Refills: 3 | Status: SHIPPED | OUTPATIENT
Start: 2023-03-20

## 2023-03-26 NOTE — PROGRESS NOTES
[facility]        Patient Care Team:  Lara Rodríguez MD as PCP - General (Family Medicine)    Chief Complaint:    Chief Complaint   Patient presents with   • Diabetes     Type 1 diabetes followup       Subjective   Here for diabetes f/u  Blood sugars: in the mid 100's  Exercise program: cardio & muscle strengthening  Forgets to take vit D    Interval History:     Patient Comments: New T slim insulin pump since Nov  Patient Denies:  hypoglycemia  History taken from: patient    Review of Systems:   Review of Systems   Constitutional: Positive for unexpected weight gain.   HENT: Negative for trouble swallowing.    Eyes: Negative for blurred vision.   Gastrointestinal: Negative for nausea.   Endocrine: Negative for polyuria.   Neurological: Negative for headache.     Gained 15 lb since last visit    Objective     Vital Signs      Vitals:    03/13/23 1205   BP: 112/78   Pulse: 72   SpO2: 100%         Physical Exam:     General Appearance:    Alert, cooperative, in no acute distress   Head:    Normocephalic, without obvious abnormality, atraumatic   Eyes:            Lids and lashes normal, conjunctivae and sclerae normal, no   icterus, no pallor, corneas clear, PERRLA   Throat:   No oral lesions,  oral mucosa moist   Neck:   No adenopathy, supple,  no thyromegaly, no   carotid bruit   Lungs:     Clear     Heart:    Regular rhythm and normal rate   Chest Wall:    No abnormalities observed   Abdomen:     Normal bowel sounds, soft                 Extremities:   Moves all extremities well, no edema               Pulses:   Pulses palpable and equal bilaterally   Skin:   Pump site clean   Neurologic:  DTR 1+ in ankles, normal vibratory sense, able to feel the 10g monofilament          Results Review:    I have reviewed the patient's new clinical results, labs & imaging.    Medication Review:   Prior to Admission medications    Medication Sig Start Date End Date Taking? Authorizing Provider   CBD oil  (cannabidiol) capsule Take  by mouth.   Yes Rick Ahuja MD   Continuous Blood Gluc  (Dexcom G6 ) device USE CONTINUOUS TO MONITOR  BLOOD SUGARS 1/10/23  Yes Dinesh Martin MD   Continuous Blood Gluc Sensor (Dexcom G6 Sensor) Pt to use to monitor her blood sugars continuously.  Pt to replace sensor every 10 days 10/28/22  Yes Dinesh Martin MD   Continuous Blood Gluc Transmit (Dexcom G6 Transmitter) misc 1 each Continuous. Pt to use to monitor her blood sugars continuously.  Pt to replace transmitter every 90 days 10/28/22  Yes Dinesh Martin MD   glucagon (GLUCAGEN) 1 MG injection Inject 1 mg under the skin into the appropriate area as directed 1 (One) Time As Needed for Low Blood Sugar.   Yes Rick Ahuja MD   Glucose Blood (ELVIS CONTOUR TEST VI) by In Vitro route. Use to check blood sugar before meals and at bedtime   Yes Rick Ahuja MD   levothyroxine (Synthroid) 50 MCG tablet Take 1 tablet by mouth Daily. 10/28/22  Yes Dinesh Martin MD   Magnesium 125 MG capsule Take  by mouth Daily.   Yes Rick Ahuja MD   Multiple Vitamins-Minerals (MULTIVITAMIN ADULT PO) Take  by mouth Daily.   Yes Rick Ahuja MD   Omega-3 Fatty Acids (FISH OIL) 1000 MG capsule capsule Take  by mouth Daily With Breakfast.   Yes Rick Ahuja MD   Cholecalciferol (VITAMIN D3) 125 MCG (5000 UT) capsule capsule Take 1 capsule by mouth Daily.    Rick Ahuja MD   Insulin Aspart (NovoLOG) 100 UNIT/ML injection For use In Pump (MAXIMUM DOSE = 80 UNITS PER DAY 3/20/23   Dinesh Martin MD   insulin glargine (LANTUS, SEMGLEE) 100 UNIT/ML injection Inject  under the skin into the appropriate area as directed Daily. Use in case of pump failure 28 units daily    Rick Ahuja MD       Lab Results (most recent)     None        Lab Results   Component Value Date    HGBA1C 7.70 (H) 03/06/2023    HGBA1C 9.7 08/02/2021      Lab Results   Component  Value Date    GLUCOSE 117 (H) 03/06/2023    BUN 11 03/06/2023    CREATININE 0.58 03/06/2023    BCR 19.0 03/06/2023    K 4.1 03/06/2023    CO2 28.4 03/06/2023    CALCIUM 9.4 03/06/2023    ALBUMIN 4.2 03/06/2023    LABIL2 1.2 01/23/2018    AST 17 03/06/2023    ALT 14 03/06/2023    CHOL 200 03/06/2023     (H) 03/06/2023    HDL 66 (H) 03/06/2023    TRIG 49 03/06/2023     Lab Results   Component Value Date    TSH 2.970 03/06/2023    FREET4 1.18 03/06/2023    [id number] 27.9 (L) 03/06/2023     Microalb/cr ratio <1    Assessment & Plan     Diagnoses and all orders for this visit:    1. Type 1 diabetes mellitus with hyperglycemia (HCC) (Primary) - improved  -     Hemoglobin A1c; Future  -     Discontinue: Insulin Lispro (HumaLOG) 100 UNIT/ML injection; For use In Pump (MAXIMUM DOSE = 80 UNITS PER DAY)  Dispense: 80 mL; Refill: 3    2. Hypothyroidism due to Hashimoto's thyroiditis - stable    3. Vitamin D deficiency - not at goal  -     Vitamin D,25-Hydroxy; Future    4. Insulin pump status    Wearing a T slim insulin pump Control IQ since Nov 2022.  Pt has agreed to wear the CGM device and share readings on a regular basis with our office    Continue exercise.  Bolus before eating.  Take vit D daily.  Call if blood sugars are running under 100 or over 200.        Dinesh Martin MD  03/25/23  21:35 EDT

## 2023-04-13 RX ORDER — LEVOTHYROXINE SODIUM 50 MCG
TABLET ORAL
Qty: 90 TABLET | Refills: 1 | Status: SHIPPED | OUTPATIENT
Start: 2023-04-13

## 2023-05-18 ENCOUNTER — TELEPHONE (OUTPATIENT)
Dept: ENDOCRINOLOGY | Facility: CLINIC | Age: 33
End: 2023-05-18
Payer: COMMERCIAL

## 2023-05-18 DIAGNOSIS — E06.3 HYPOTHYROIDISM DUE TO HASHIMOTO'S THYROIDITIS: Primary | ICD-10-CM

## 2023-05-18 DIAGNOSIS — E03.8 HYPOTHYROIDISM DUE TO HASHIMOTO'S THYROIDITIS: Primary | ICD-10-CM

## 2023-05-18 NOTE — TELEPHONE ENCOUNTER
"Caller: Sandra Corey \"Valarie\"    Relationship: Self    Best call back number: [phone]    What form or medical record are you requesting: PRINTOUT FOR REQUESTED LABS    Who is requesting this form or medical record from you: [facility]    How would you like to receive the form or medical records (pick-up, mail, fax): MAIL  If mail, what is the address:   [address] [place] [address]    Timeframe paperwork needed: BY EARLY OCTOBER    PLEASE CALL TO CONFIRM WHEN PACKET IS MAILED.       "

## 2023-09-29 DIAGNOSIS — Z12.31 VISIT FOR SCREENING MAMMOGRAM: Primary | ICD-10-CM

## 2023-10-10 DIAGNOSIS — E10.65 TYPE 1 DIABETES MELLITUS WITH HYPERGLYCEMIA: ICD-10-CM

## 2023-10-10 RX ORDER — PROCHLORPERAZINE 25 MG/1
SUPPOSITORY RECTAL
Qty: 9 EACH | Refills: 3 | Status: SHIPPED | OUTPATIENT
Start: 2023-10-10

## 2023-10-10 RX ORDER — LEVOTHYROXINE SODIUM 50 MCG
TABLET ORAL
Qty: 90 TABLET | Refills: 1 | Status: SHIPPED | OUTPATIENT
Start: 2023-10-10

## 2023-10-10 RX ORDER — PROCHLORPERAZINE 25 MG/1
SUPPOSITORY RECTAL
Qty: 1 EACH | Refills: 3 | Status: SHIPPED | OUTPATIENT
Start: 2023-10-10

## 2023-10-16 ENCOUNTER — TELEPHONE (OUTPATIENT)
Dept: ENDOCRINOLOGY | Facility: CLINIC | Age: 33
End: 2023-10-16
Payer: COMMERCIAL

## 2023-10-16 NOTE — TELEPHONE ENCOUNTER
Provider: JANELLE CARLSON     Caller: MAYE MEADOWS     Relationship to Patient: SELF    Phone Number: [phone]    Reason for Call: THE PT CALLED BECAUSE SHE HAD AN EXTENSIVE BLOOD PANEL DRAW ON 09/06/23 AND SHE KNOWS  WANTS HER TO GET LABS DONE BEFORE HER APPT, SHE WOULD LIKE TO KNOW IF THE LABS SHE GOT DONE AT THE OTHER CLINIC WILL WORK FOR  OR IF SHE NEEDS TO MAKE ANOTHER APPOINTMENT TO GET LABS DONE.SHE WOULD LIKE SOMEONE TO CALL HER TO DISCUSS THIS.     When was the patient last seen: 03/13/23

## 2023-10-19 NOTE — TELEPHONE ENCOUNTER
Called pt about recent blood panel that was done on 9/6/23. Pt did have A1C, VitD, T4 Free, and TSH. Would that be ok for f/u appt. on 11/20/2023? Or would she need to have another panel done prior to appt.? Please advise.

## 2023-11-20 ENCOUNTER — OFFICE VISIT (OUTPATIENT)
Dept: ENDOCRINOLOGY | Facility: CLINIC | Age: 33
End: 2023-11-20
Payer: COMMERCIAL

## 2023-11-20 VITALS
HEIGHT: 65 IN | BODY MASS INDEX: 29.49 KG/M2 | SYSTOLIC BLOOD PRESSURE: 108 MMHG | OXYGEN SATURATION: 99 % | WEIGHT: 177 LBS | DIASTOLIC BLOOD PRESSURE: 74 MMHG | HEART RATE: 64 BPM

## 2023-11-20 DIAGNOSIS — E06.3 HYPOTHYROIDISM DUE TO HASHIMOTO'S THYROIDITIS: ICD-10-CM

## 2023-11-20 DIAGNOSIS — E55.9 VITAMIN D DEFICIENCY: ICD-10-CM

## 2023-11-20 DIAGNOSIS — E10.65 TYPE 1 DIABETES MELLITUS WITH HYPERGLYCEMIA: Primary | ICD-10-CM

## 2023-11-20 DIAGNOSIS — Z96.41 INSULIN PUMP STATUS: ICD-10-CM

## 2023-11-20 DIAGNOSIS — E03.8 HYPOTHYROIDISM DUE TO HASHIMOTO'S THYROIDITIS: ICD-10-CM

## 2023-11-20 PROCEDURE — 99214 OFFICE O/P EST MOD 30 MIN: CPT | Performed by: INTERNAL MEDICINE

## 2023-11-20 RX ORDER — GLUCAGON 3 MG/1
3 POWDER NASAL AS NEEDED
Qty: 1 EACH | Refills: 1 | Status: SHIPPED | OUTPATIENT
Start: 2023-11-20

## 2023-11-20 NOTE — PATIENT INSTRUCTIONS
Keep up the good work!  Continue exercise.  Continue same pump settings & Synthroid.  Increase vit D to 50 mcg daily.  Call if blood sugars are running under 100 or over 200.  F/u in 6 months, with labs prior.

## 2023-12-04 NOTE — PROGRESS NOTES
[facility]        Patient Care Team:  Provider, No Known as PCP - General    Chief Complaint:    Chief Complaint   Patient presents with    Diabetes     FU / DM TYPE 1 / ; basal rates: 12a 1.1, 6a 0.9 units/h         Subjective   Here for diabetes f/u  Blood sugars: mid 100's  Insulin delivery per pump: Basal 51%/ bolus 49%  98% auto mode  Exercise program: cardio & strength training 3 d / wk  Taking vit D drops 25 mcg (1000 units)  Takes Synthroid brand    Interval History:     Patient Complaints: none  Patient Denies: hypoglycemia  History taken from: patient    Review of Systems:   Review of Systems   Eyes:  Negative for blurred vision.   Gastrointestinal:  Negative for nausea.   Endocrine: Negative for polyuria.   Neurological:  Negative for headache.   Lost  3 lb since last visit    Objective     Vital Signs     Vitals:    11/20/23 1222   BP: 108/74   Pulse: 64   SpO2: 99%         Physical Exam:     General Appearance:    Alert, cooperative, in no acute distress   Head:    Normocephalic, without obvious abnormality, atraumatic   Eyes:            Lids and lashes normal, conjunctivae and sclerae normal, no   icterus, no pallor, corneas clear, PERRLA   Throat:   No oral lesions,  oral mucosa moist   Neck:   No adenopathy, supple,  no thyromegaly, no carotid bruit   Lungs:     Clear    Heart:    Regular rhythm and normal rate   Chest Wall:    No abnormalities observed   Abdomen:     Normal bowel sounds, soft                 Extremities:   Moves all extremities well, no edema               Pulses:   Pulses palpable and equal bilaterally   Skin:   Pump site clean   Neurologic:  DTR 1+, able to feel the 10g monofilament          Results Review:    I have reviewed the patient's new clinical results, labs & imaging.    Medication Review:   Prior to Admission medications    Medication Sig Start Date End Date Taking? Authorizing Provider   CBD oil (cannabidiol) capsule Take  by mouth.   Yes  Rick Ahuja MD   Continuous Blood Gluc  (Dexcom G6 ) device USE CONTINUOUS TO MONITOR  BLOOD SUGARS 1/10/23  Yes Dinesh Martin MD   Continuous Blood Gluc Sensor (Dexcom G6 Sensor) USE CONTINUOUS TO MONITOR  BLOOD SUGARS, REPLACE      SENSOR EVERY 10 DAYS 10/10/23  Yes Dinesh Martin MD   Continuous Blood Gluc Transmit (Dexcom G6 Transmitter) misc USE CONTINUOUS TO MONITOR  BLOOD SUGARS, REPLACE      TRANSMITTER EVERY 90 DAYS 10/10/23  Yes Dinesh Martin MD   Glucose Blood (ELVIS CONTOUR TEST VI) by In Vitro route. Use to check blood sugar before meals and at bedtime   Yes Rick Ahuja MD   Insulin Aspart (NovoLOG) 100 UNIT/ML injection For use In Pump (MAXIMUM DOSE = 80 UNITS PER DAY 3/20/23  Yes Dinesh Martin MD   insulin glargine (LANTUS, SEMGLEE) 100 UNIT/ML injection Inject  under the skin into the appropriate area as directed Daily. Use in case of pump failure 28 units daily   Yes Rick Ahuja MD   Magnesium 125 MG capsule Take  by mouth Daily.   Yes Rick Ahuja MD   Multiple Vitamins-Minerals (MULTIVITAMIN ADULT PO) Take  by mouth Daily.   Yes Rick Ahuja MD   Omega-3 Fatty Acids (FISH OIL) 1000 MG capsule capsule Take  by mouth Daily With Breakfast.   Yes Rick Ahuja MD   Synthroid 50 MCG tablet TAKE 1 TABLET DAILY 10/10/23  Yes Dinesh Martin MD   Cholecalciferol 50 MCG (2000 UT) capsule Take one daily 11/20/23   Dinesh Martin MD   Glucagon (Baqsimi One Pack) 3 MG/DOSE powder 3 mg into the nostril(s) as directed by provider As Needed (severe hypoglycemia). 11/20/23   Dinesh Martin MD         Lab Results   Component Value Date    HGBA1C 7.70 (H) 03/06/2023    HGBA1C 9.7 08/02/2021      Lab Results   Component Value Date    GLUCOSE 117 (H) 03/06/2023    BUN 11 03/06/2023    CREATININE 0.58 03/06/2023    BCR 19.0 03/06/2023    K 4.1 03/06/2023    CO2 28.4 03/06/2023    CALCIUM 9.4 03/06/2023    ALBUMIN 4.2  03/06/2023    LABIL2 1.2 01/23/2018    AST 17 03/06/2023    ALT 14 03/06/2023    CHOL 200 03/06/2023     (H) 03/06/2023    HDL 66 (H) 03/06/2023    TRIG 49 03/06/2023     Lab Results   Component Value Date    TSH 2.970 03/06/2023    FREET4 1.18 03/06/2023    [id number] 27.9 (L) 03/06/2023     A1C 7.7%; cr 0.7, K 4.6, ALT 17; Chol 208, Trigl 42, , HDL 72; TSH 1.087, FreeT4 1.21;  Vit D level 28 on 8/31/2023.    Assessment & Plan     Diagnoses and all orders for this visit:    1. Type 1 diabetes mellitus with hyperglycemia (Primary) - improved  -     Glucagon (Baqsimi One Pack) 3 MG/DOSE powder; 3 mg into the nostril(s) as directed by provider As Needed (severe hypoglycemia).  Dispense: 1 each; Refill: 1  -     Comprehensive Metabolic Panel; Future  -     Hemoglobin A1c; Future    2. Hypothyroidism due to Hashimoto's thyroiditis - stable    3. Vitamin D deficiency - not @ goal  -     Cholecalciferol 50 MCG (2000 UT) capsule; Take one daily  -     Vitamin D,25-Hydroxy; Future    4. Insulin pump status    Wearing a T slim insulin pump Control IQ since Nov 2022.  Pt has agreed to wear the CGM device and share readings on a regular basis with our office    Continue exercise.  Continue same pump settings & Synthroid.  Increase vit D to 50 mcg daily.  Call if blood sugars are running under 100 or over 200.        Dinesh Martin MD  12/03/23  21:12 EST

## 2023-12-22 DIAGNOSIS — E10.65 TYPE 1 DIABETES MELLITUS WITH HYPERGLYCEMIA: Primary | ICD-10-CM

## 2023-12-27 RX ORDER — ACYCLOVIR 400 MG/1
1 TABLET ORAL AS NEEDED
Qty: 9 EACH | Refills: 3 | Status: SHIPPED | OUTPATIENT
Start: 2023-12-27

## 2024-02-05 DIAGNOSIS — E10.65 TYPE 1 DIABETES MELLITUS WITH HYPERGLYCEMIA: ICD-10-CM

## 2024-02-05 DIAGNOSIS — E06.3 HYPOTHYROIDISM DUE TO HASHIMOTO'S THYROIDITIS: Primary | ICD-10-CM

## 2024-02-05 DIAGNOSIS — E03.8 HYPOTHYROIDISM DUE TO HASHIMOTO'S THYROIDITIS: Primary | ICD-10-CM

## 2024-02-05 RX ORDER — INSULIN LISPRO 100 [IU]/ML
INJECTION, SOLUTION INTRAVENOUS; SUBCUTANEOUS
Qty: 80 ML | Refills: 3 | Status: SHIPPED | OUTPATIENT
Start: 2024-02-05

## 2024-02-05 RX ORDER — LEVOTHYROXINE SODIUM 0.05 MG/1
50 TABLET ORAL DAILY
Qty: 90 TABLET | Refills: 2 | Status: SHIPPED | OUTPATIENT
Start: 2024-02-05

## 2024-02-05 RX ORDER — ACYCLOVIR 400 MG/1
1 TABLET ORAL AS NEEDED
Qty: 9 EACH | Refills: 3 | Status: SHIPPED | OUTPATIENT
Start: 2024-02-05

## 2024-02-27 ENCOUNTER — HOSPITAL ENCOUNTER (OUTPATIENT)
Facility: HOSPITAL | Age: 34
Discharge: HOME | End: 2024-02-27
Admitting: OBSTETRICS & GYNECOLOGY
Payer: COMMERCIAL

## 2024-02-27 ENCOUNTER — OFFICE VISIT (OUTPATIENT)
Dept: OBSTETRICS AND GYNECOLOGY | Age: 34
End: 2024-02-27
Payer: COMMERCIAL

## 2024-02-27 VITALS
SYSTOLIC BLOOD PRESSURE: 112 MMHG | WEIGHT: 180 LBS | DIASTOLIC BLOOD PRESSURE: 74 MMHG | HEIGHT: 65 IN | BODY MASS INDEX: 29.99 KG/M2

## 2024-02-27 DIAGNOSIS — Z30.09 GENERAL COUNSELING AND ADVICE FOR CONTRACEPTIVE MANAGEMENT: ICD-10-CM

## 2024-02-27 DIAGNOSIS — Z11.51 SCREENING FOR HPV (HUMAN PAPILLOMAVIRUS): ICD-10-CM

## 2024-02-27 DIAGNOSIS — E10.9 TYPE 1 DIABETES MELLITUS WITHOUT COMPLICATION: Primary | ICD-10-CM

## 2024-02-27 DIAGNOSIS — Z12.31 BREAST CANCER SCREENING BY MAMMOGRAM: ICD-10-CM

## 2024-02-27 DIAGNOSIS — Z12.4 SCREENING FOR MALIGNANT NEOPLASM OF CERVIX: ICD-10-CM

## 2024-02-27 DIAGNOSIS — E06.3 HYPOTHYROIDISM DUE TO HASHIMOTO'S THYROIDITIS: ICD-10-CM

## 2024-02-27 DIAGNOSIS — Z00.00 ENCOUNTER FOR ANNUAL PHYSICAL EXAM: ICD-10-CM

## 2024-02-27 DIAGNOSIS — E03.8 HYPOTHYROIDISM DUE TO HASHIMOTO'S THYROIDITIS: ICD-10-CM

## 2024-02-27 DIAGNOSIS — Z12.31 VISIT FOR SCREENING MAMMOGRAM: ICD-10-CM

## 2024-02-27 DIAGNOSIS — E55.9 VITAMIN D DEFICIENCY: ICD-10-CM

## 2024-02-27 PROCEDURE — 77063 BREAST TOMOSYNTHESIS BI: CPT

## 2024-02-27 PROCEDURE — 77067 SCR MAMMO BI INCL CAD: CPT

## 2024-02-27 NOTE — PROGRESS NOTES
Subjective     Chief Complaint   Patient presents with    Gynecologic Exam     Annual Exam - last pap 2/13/23 neg, mammo today, pt has no complaints today         History of Present Illness    Wellness exam  Sandra Davidson is a very pleasant  33 y.o. female who presents for annual exam.  Mammo Exam today as a baseline, Contraception NFP cycles are fairly regularly mostly ovulates on day 21 with 35 to 34-day cycles but talked about other options she was inquiring about diaphragms., Exercise encouraged  Patient has an insulin pump and that seems to be working quite nicely.  She has no GYN concerns or complaints otherwise she sees her endocrinologist at least a couple times a year has an upcoming appointment.  .      Obstetric History:  OB History    No obstetric history on file.        Menstrual History:     Patient's last menstrual period was 01/29/2024 (exact date).       Sexual History:       Past Medical History:   Diagnosis Date    ASCUS of cervix with negative high risk HPV     Bacterial vaginitis     Diabetes mellitus type I     History of shingles 12/01/2020    Hypothyroidism     IDM (infant of diabetic mother)     Proteinuria      Past Surgical History:   Procedure Laterality Date    TOOTH EXTRACTION      WISDOM TOOTH EXTRACTION         SOCIAL Hx:      The following portions of the patient's history were reviewed and updated as appropriate: allergies, current medications, past family history, past medical history, past social history, past surgical history and problem list.    Review of Systems        Except as outlined in history of physical illness, patient denies any changes in her GYN, , GI systems.  All other systems reviewed were negative.         Current Outpatient Medications:     CBD oil (cannabidiol) capsule, Take  by mouth., Disp: , Rfl:     Cholecalciferol 50 MCG (2000 UT) capsule, Take one daily, Disp: , Rfl:     Continuous Blood Gluc Sensor (Dexcom G7 Sensor) misc, Use 1 each  "As Needed (change sensor every 10 days). DX E10.65, Disp: 9 each, Rfl: 3    Glucagon (Baqsimi One Pack) 3 MG/DOSE powder, 3 mg into the nostril(s) as directed by provider As Needed (severe hypoglycemia)., Disp: 1 each, Rfl: 1    Glucose Blood (ELVIS CONTOUR TEST VI), by In Vitro route. Use to check blood sugar before meals and at bedtime, Disp: , Rfl:     insulin glargine (LANTUS, SEMGLEE) 100 UNIT/ML injection, Inject  under the skin into the appropriate area as directed Daily. Use in case of pump failure 28 units daily, Disp: , Rfl:     Insulin Lispro (humaLOG) 100 UNIT/ML injection, For use In Pump (MAXIMUM DOSE = 80 UNITS PER DAY, Disp: 80 mL, Rfl: 3    levothyroxine (Synthroid) 50 MCG tablet, Take 1 tablet by mouth Daily., Disp: 90 tablet, Rfl: 2    Magnesium 125 MG capsule, Take  by mouth Daily., Disp: , Rfl:     Multiple Vitamins-Minerals (MULTIVITAMIN ADULT PO), Take  by mouth Daily., Disp: , Rfl:     Omega-3 Fatty Acids (FISH OIL) 1000 MG capsule capsule, Take  by mouth Daily With Breakfast., Disp: , Rfl:    Objective   Physical Exam    /74   Ht 165 cm (64.96\")   Wt 81.6 kg (180 lb)   LMP 01/29/2024 (Exact Date)   BMI 29.99 kg/m²     General: Patient is alert and oriented and appears overall healthy  Neck: Is supple without thyromegaly, no carotid bruits and no lymphadenopathy  Lungs: Clear bilaterally, no wheezing, rhonchi, or rales.  Respiratory rate is normal  Breast: Even, symmetrical, no lymphadenopathy, no retraction, no discharge, no masses or cysts  Heart: Regular rate and rhythm are appreciated, no murmurs or rubs are heard  Abdomen: Is soft, without organomegaly, bowel sounds are positive, there is no rebound or guarding and palpation does not produce any discomfort  Back: Nontender without CVA tenderness  Pelvic: External genitalia appear normal and consistent with mature female.  BUS normal                            Vagina is clean dry without discharge and appears adequately " estrogenized, no lesions or masses are present                         Cervix is noninflamed without discharge or lesions.  There is no cervical motion tenderness.                Uterus is nonenlarged, without tenderness, and no masses or abnormalities are  present               Adnexa are non-enlarged, non tender               Rectal exam reveals adequate sphincter tone and no masses or lesions are appreciated on digital rectal examination.      Annual Well Woman Exam  Patient Active Problem List   Diagnosis    IDM (infant of diabetic mother)    Vomiting    Type 1 diabetes mellitus with hyperglycemia    Type 1 diabetes    Itching in the vaginal area    Insulin pump status    Hypothyroidism    Diabetes mellitus type I    Vitamin D deficiency                 Assessment & Plan   Diagnoses and all orders for this visit:    1. Type 1 diabetes mellitus without complication (Primary)    2. Hypothyroidism due to Hashimoto's thyroiditis    3. Encounter for annual physical exam    4. Breast cancer screening by mammogram    5. Vitamin D deficiency    6. Screening for malignant neoplasm of cervix  -     IGP, Aptima HPV, Rfx 16 / 18,45    7. Screening for HPV (human papillomavirus)  -     IGP, Aptima HPV, Rfx 16 / 18,45    8. General counseling and advice for contraceptive management    Patient inquired about diaphragms we discussed availability failure rates usage techniques etc. she would be a size 75    Discussed today's findings and concerns with patient.  Continue to recommend regular exercise including cardiovascular and resistance training as well as  breast self-exam. Wellness lab, mammography, & pap smear, in accordance with age guidelines.    I have encouraged her to call for today's test results if she has not received them within 10 days.  Patient is advised to call with any change in her condition or with any other questions, otherwise return  for annual examination.

## 2024-03-01 LAB
CYTOLOGIST CVX/VAG CYTO: NORMAL
CYTOLOGY CVX/VAG DOC CYTO: NORMAL
CYTOLOGY CVX/VAG DOC THIN PREP: NORMAL
DX ICD CODE: NORMAL
HPV GENOTYPE REFLEX: NORMAL
HPV I/H RISK 4 DNA CVX QL PROBE+SIG AMP: NEGATIVE
Lab: NORMAL
OTHER STN SPEC: NORMAL
STAT OF ADQ CVX/VAG CYTO-IMP: NORMAL

## 2024-04-18 ENCOUNTER — TELEPHONE (OUTPATIENT)
Dept: ENDOCRINOLOGY | Facility: CLINIC | Age: 34
End: 2024-04-18
Payer: COMMERCIAL

## 2024-04-18 NOTE — TELEPHONE ENCOUNTER
Pt called requesting PA for Dexcom G7 sensors. Per CovermyMeds : Drug is covered by current benefit plan. No further PA activity needed. Informed pt and gave her the Key: [id number]. Pt is reaching out to her insurance and is to call back with any further questions or problems.     Pt's insurance called to start PA for Dexcom G7 sensors.    PA approved through 4/18/2025  [id number]

## 2024-07-16 ENCOUNTER — OFFICE VISIT (OUTPATIENT)
Dept: ENDOCRINOLOGY | Facility: CLINIC | Age: 34
End: 2024-07-16
Payer: COMMERCIAL

## 2024-07-16 VITALS
HEART RATE: 68 BPM | BODY MASS INDEX: 28.76 KG/M2 | SYSTOLIC BLOOD PRESSURE: 104 MMHG | OXYGEN SATURATION: 99 % | HEIGHT: 65 IN | DIASTOLIC BLOOD PRESSURE: 68 MMHG | WEIGHT: 172.6 LBS

## 2024-07-16 DIAGNOSIS — E06.3 HYPOTHYROIDISM DUE TO HASHIMOTO'S THYROIDITIS: ICD-10-CM

## 2024-07-16 DIAGNOSIS — Z96.41 INSULIN PUMP STATUS: ICD-10-CM

## 2024-07-16 DIAGNOSIS — E55.9 VITAMIN D DEFICIENCY: ICD-10-CM

## 2024-07-16 DIAGNOSIS — E03.8 HYPOTHYROIDISM DUE TO HASHIMOTO'S THYROIDITIS: ICD-10-CM

## 2024-07-16 DIAGNOSIS — E10.65 TYPE 1 DIABETES MELLITUS WITH HYPERGLYCEMIA: Primary | ICD-10-CM

## 2024-07-16 PROCEDURE — 99214 OFFICE O/P EST MOD 30 MIN: CPT | Performed by: INTERNAL MEDICINE

## 2024-07-16 NOTE — PATIENT INSTRUCTIONS
Keep up the good work!  See eye doctor in Sept.  Continue exercise.  Bolus before eating.  Continue same pump settings, levothyroxine, Omega 3 & vit D supplements.  Call if blood sugars are running under 100 or over 200.  F/u in 6 months, with fasting labs prior.

## 2024-07-22 NOTE — PROGRESS NOTES
[facility]        Patient Care Team:  Provider, No Known as PCP - General    Chief Complaint:    Chief Complaint   Patient presents with    Diabetes     Fu / DM type 1 / BS Dexcom 143         Subjective   Here for diabetes f/u  Blood sugars: up post eating  Insulin delivery per pump: Basal 58%/ bolus 42%  93% auto mode  Exercise program: strength training  Taking vit D  Taking generic levothyroxine    Interval History:     Patient Complaints:  forgets to bolus ac  Patient Denies: hypoglycemia  History taken from: patient    Review of Systems:   Review of Systems   Eyes:  Negative for blurred vision.   Gastrointestinal:  Negative for nausea.   Endocrine: Negative for polyuria.   Neurological:  Negative for headache.   Lost  5 lb since last visit    Objective     Vital Signs     Vitals:    07/16/24 0759   BP: 104/68   Pulse: 68   SpO2: 99%         Physical Exam:     General Appearance:    Alert, cooperative, in no acute distress   Head:    Normocephalic, without obvious abnormality, atraumatic   Eyes:            Lids and lashes normal, conjunctivae and sclerae normal, no   icterus, no pallor, corneas clear, PERRLA   Throat:   No oral lesions,  oral mucosa moist   Neck:   No adenopathy, supple,  no thyromegaly, no carotid bruit   Lungs:     Clear     Heart:    Regular rhythm and normal rate   Chest Wall:    No abnormalities observed   Abdomen:     Normal bowel sounds, soft                 Extremities:   Moves all extremities well, no edema               Pulses:   Pulses palpable and equal bilaterally   Skin:   Pump site clean   Neurologic:  DTR 1+ in ankles, normal vibratory sense, able to feel the 10g monofilament          Results Review:    I have reviewed the patient's new clinical results, labs & imaging.    Medication Review:   Prior to Admission medications    Medication Sig Start Date End Date Taking? Authorizing Provider   CBD oil (cannabidiol) capsule Take  by mouth.   Yes Provider,  MD Rick   Cholecalciferol 50 MCG (2000 UT) capsule Take one daily 11/20/23  Yes Dinesh Martin MD   Continuous Blood Gluc Sensor (Dexcom G7 Sensor) misc Use 1 each As Needed (change sensor every 10 days). DX E10.65 2/5/24  Yes Dinesh Martin MD   Glucagon (Baqsimi One Pack) 3 MG/DOSE powder 3 mg into the nostril(s) as directed by provider As Needed (severe hypoglycemia). 11/20/23  Yes Dinesh Martin MD   Glucose Blood (ELVIS CONTOUR TEST VI) by In Vitro route. Use to check blood sugar before meals and at bedtime   Yes Rick Ahuja MD   insulin glargine (LANTUS, SEMGLEE) 100 UNIT/ML injection Inject  under the skin into the appropriate area as directed Daily. Use in case of pump failure 28 units daily   Yes Rick Ahuja MD   Insulin Lispro (humaLOG) 100 UNIT/ML injection For use In Pump (MAXIMUM DOSE = 80 UNITS PER DAY 2/5/24  Yes Dinesh Martin MD   levothyroxine (Synthroid) 50 MCG tablet Take 1 tablet by mouth Daily. 2/5/24  Yes Dinesh Martin MD   Magnesium 125 MG capsule Take  by mouth Daily.   Yes Rick Ahuja MD   Multiple Vitamins-Minerals (MULTIVITAMIN ADULT PO) Take  by mouth Daily.   Yes Rick Ahuja MD   Omega-3 Fatty Acids (FISH OIL) 1000 MG capsule capsule Take  by mouth Daily With Breakfast.   Yes Rick Ahuja MD         Lab Results   Component Value Date    HGBA1C 7.70 (H) 03/06/2023    HGBA1C 9.7 08/02/2021      Lab Results   Component Value Date    GLUCOSE 117 (H) 03/06/2023    BUN 11 03/06/2023    CREATININE 0.58 03/06/2023    BCR 19.0 03/06/2023    K 4.1 03/06/2023    CO2 28.4 03/06/2023    CALCIUM 9.4 03/06/2023    ALBUMIN 4.2 03/06/2023    LABIL2 1.2 01/23/2018    AST 17 03/06/2023    ALT 14 03/06/2023    CHOL 200 03/06/2023     (H) 03/06/2023    HDL 66 (H) 03/06/2023    TRIG 49 03/06/2023     Lab Results   Component Value Date    TSH 2.970 03/06/2023    FREET4 1.18 03/06/2023    HGDV29AM 27.9 (L) 03/06/2023     A1C  7.6%; cr 0.62, K 4.5, ALT 11; vit D level 30 on 7/3/2024    Assessment & Plan     Diagnoses and all orders for this visit:    1. Type 1 diabetes mellitus with hyperglycemia (Primary) - improved  -     Hemoglobin A1c; Future  -     Microalbumin / Creatinine Urine Ratio - Urine, Clean Catch; Future  -     Comprehensive Metabolic Panel; Future  -     Lipid Panel; Future    2. Hypothyroidism due to Hashimoto's thyroiditis - will check status next visit  -     Lipid Panel; Future  -     TSH; Future  -     T4, Free; Future    3. Vitamin D deficiency - improved    4. Insulin pump status    Wearing a T slim insulin pump Control IQ since Nov 2022.  Pt has agreed to wear the CGM device and share readings on a regular basis with our office.    See eye doctor in Sept.  Continue exercise.  Bolus before eating.  Continue same pump settings, levothyroxine, Omega 3 & vit D supplements.  Call if blood sugars are running under 100 or over 200.        Dinesh Martin MD  07/21/24  21:16 EDT

## 2024-10-11 DIAGNOSIS — E10.65 TYPE 1 DIABETES MELLITUS WITH HYPERGLYCEMIA: ICD-10-CM

## 2024-10-11 DIAGNOSIS — E06.3 HYPOTHYROIDISM DUE TO HASHIMOTO'S THYROIDITIS: ICD-10-CM

## 2024-10-11 RX ORDER — LEVOTHYROXINE SODIUM 50 UG/1
50 TABLET ORAL DAILY
Qty: 90 TABLET | Refills: 3 | Status: SHIPPED | OUTPATIENT
Start: 2024-10-11

## 2024-12-05 ENCOUNTER — TELEPHONE (OUTPATIENT)
Dept: ENDOCRINOLOGY | Facility: CLINIC | Age: 34
End: 2024-12-05
Payer: COMMERCIAL

## 2025-01-14 ENCOUNTER — TELEPHONE (OUTPATIENT)
Dept: ENDOCRINOLOGY | Facility: CLINIC | Age: 35
End: 2025-01-14
Payer: COMMERCIAL

## 2025-01-17 LAB — HBA1C MFR BLD: 8.3 % (ref 4.8–5.6)

## 2025-02-08 NOTE — PATIENT INSTRUCTIONS
Problem: Adult Inpatient Plan of Care  Goal: Plan of Care Review  Outcome: Progressing  Goal: Patient-Specific Goal (Individualized)  Outcome: Progressing  Goal: Absence of Hospital-Acquired Illness or Injury  Outcome: Progressing  Goal: Optimal Comfort and Wellbeing  Outcome: Progressing  Goal: Readiness for Transition of Care  Outcome: Progressing     Problem: Wound  Goal: Optimal Coping  Outcome: Progressing  Goal: Optimal Functional Ability  Outcome: Progressing  Goal: Absence of Infection Signs and Symptoms  Outcome: Progressing  Goal: Improved Oral Intake  Outcome: Progressing  Goal: Optimal Pain Control and Function  Outcome: Progressing  Goal: Skin Health and Integrity  Outcome: Progressing  Goal: Optimal Wound Healing  Outcome: Progressing     Problem: Fall Injury Risk  Goal: Absence of Fall and Fall-Related Injury  Outcome: Progressing     Problem: Pain Acute  Goal: Optimal Pain Control and Function  Outcome: Progressing      Keep up the good work!  See eye doctor.  Continue exercise.  Continue meds.  Change ICR to 1:8.  Call if blood sugars are running under 100 or over 200.  F/u in 6 months, with fasting labs prior.

## 2025-02-25 ENCOUNTER — OFFICE VISIT (OUTPATIENT)
Dept: ENDOCRINOLOGY | Facility: CLINIC | Age: 35
End: 2025-02-25
Payer: COMMERCIAL

## 2025-02-25 VITALS
WEIGHT: 172 LBS | SYSTOLIC BLOOD PRESSURE: 122 MMHG | HEART RATE: 75 BPM | HEIGHT: 65 IN | OXYGEN SATURATION: 96 % | DIASTOLIC BLOOD PRESSURE: 64 MMHG | BODY MASS INDEX: 28.66 KG/M2

## 2025-02-25 DIAGNOSIS — E55.9 VITAMIN D DEFICIENCY: ICD-10-CM

## 2025-02-25 DIAGNOSIS — E10.65 TYPE 1 DIABETES MELLITUS WITH HYPERGLYCEMIA: Primary | ICD-10-CM

## 2025-02-25 DIAGNOSIS — Z96.41 INSULIN PUMP STATUS: ICD-10-CM

## 2025-02-25 DIAGNOSIS — E06.3 HYPOTHYROIDISM DUE TO HASHIMOTO'S THYROIDITIS: ICD-10-CM

## 2025-02-25 PROCEDURE — 99214 OFFICE O/P EST MOD 30 MIN: CPT | Performed by: INTERNAL MEDICINE

## 2025-02-25 NOTE — PATIENT INSTRUCTIONS
Continue exercise.  Continue levothyroxine, Omega 3 & vit D supplements.  Bolus before eating.  Call if blood sugars are running under 100 or over 200.  F/u in 6 months, with labs prior.

## 2025-03-04 ENCOUNTER — OFFICE VISIT (OUTPATIENT)
Dept: OBSTETRICS AND GYNECOLOGY | Age: 35
End: 2025-03-04
Payer: COMMERCIAL

## 2025-03-04 VITALS
BODY MASS INDEX: 28.82 KG/M2 | HEIGHT: 65 IN | SYSTOLIC BLOOD PRESSURE: 102 MMHG | DIASTOLIC BLOOD PRESSURE: 60 MMHG | WEIGHT: 173 LBS

## 2025-03-04 DIAGNOSIS — Z01.419 ENCOUNTER FOR GYNECOLOGICAL EXAMINATION: Primary | ICD-10-CM

## 2025-03-04 DIAGNOSIS — Z12.4 SCREENING FOR CERVICAL CANCER: ICD-10-CM

## 2025-03-04 NOTE — PROGRESS NOTES
[facility]        Patient Care Team:  Provider, No Known as PCP - General    Chief Complaint:    Chief Complaint   Patient presents with    Diabetes     FU / DM type 1 /          Subjective   Here for diabetes f/u  Blood sugars: high 100's  Insulin delivery per pump: Basal 52%/ bolus 48%  50% auto mode  Exercise program: cardio 4d/wk  Taking vit D  Taking levothyroxine regularly    Interval History:     Patient Complaints: doesn't always bolus before eating  Patient Denies: hypoglycemia  History taken from: patient    Review of Systems:   Review of Systems   Eyes:  Negative for blurred vision.   Gastrointestinal:  Negative for nausea.   Endocrine: Negative for polyuria.   Neurological:  Negative for headache.   No wt change since last visit    Objective     Vital Signs     Vitals:    02/25/25 0831   BP: 122/64   Pulse: 75   SpO2: 96%         Physical Exam:     General Appearance:    Alert, cooperative, in no acute distress   Head:    Normocephalic, without obvious abnormality, atraumatic   Eyes:            Lids and lashes normal, conjunctivae and sclerae normal, no   icterus, no pallor, corneas clear, PERRLA   Throat:   No oral lesions,  oral mucosa moist   Neck:   No adenopathy, supple,  no thyromegaly, no carotid bruit   Lungs:     Clear    Heart:    Regular rhythm and normal rate   Chest Wall:    No abnormalities observed   Abdomen:     Normal bowel sounds, soft                 Extremities:   Moves all extremities well, no edema               Pulses:   Pulses palpable and equal bilaterally   Skin:   Pump site clean   Neurologic:  DTR 1+, able to feel the 10g monofilament          Results Review:    I have reviewed the patient's new clinical results, labs & imaging.    Medication Review:   Prior to Admission medications    Medication Sig Start Date End Date Taking? Authorizing Provider   CBD oil (cannabidiol) capsule Take  by mouth.   Yes Provider, MD Rick   Cholecalciferol 50  MCG (2000 UT) capsule Take one daily 11/20/23  Yes Dinesh Martin MD   Continuous Blood Gluc Sensor (Dexcom G7 Sensor) misc Use 1 each As Needed (change sensor every 10 days). DX E10.65 2/5/24  Yes Dinesh Martin MD   Glucagon (Baqsimi One Pack) 3 MG/DOSE powder 3 mg into the nostril(s) as directed by provider As Needed (severe hypoglycemia). 11/20/23  Yes Dinesh Martin MD   Glucose Blood (ELVIS CONTOUR TEST VI) by In Vitro route. Use to check blood sugar before meals and at bedtime   Yes Rick Ahuja MD   insulin glargine (LANTUS, SEMGLEE) 100 UNIT/ML injection Inject  under the skin into the appropriate area as directed Daily. Use in case of pump failure 28 units daily   Yes Rick Ahuja MD   Insulin Lispro (humaLOG) 100 UNIT/ML injection For use In Pump (MAXIMUM DOSE = 80 UNITS PER DAY 2/5/24  Yes Dinesh Martin MD   levothyroxine (Synthroid) 50 MCG tablet Take 1 tablet by mouth Daily. 10/11/24  Yes Dinesh Martin MD   Magnesium 125 MG capsule Take  by mouth Daily.   Yes Rick Ahuja MD   Multiple Vitamins-Minerals (MULTIVITAMIN ADULT PO) Take  by mouth Daily.   Yes Rick Ahuja MD   Omega-3 Fatty Acids (FISH OIL) 1000 MG capsule capsule Take  by mouth Daily With Breakfast.   Yes Rick Ahuja MD         Lab Results   Component Value Date    HGBA1C 8.30 (A) 12/18/2024    HGBA1C 7.70 (H) 03/06/2023    HGBA1C 9.7 08/02/2021      Lab Results   Component Value Date    GLUCOSE 117 (H) 03/06/2023    BUN 11 03/06/2023    CREATININE 0.58 03/06/2023    BCR 19.0 03/06/2023    K 4.1 03/06/2023    CO2 28.4 03/06/2023    CALCIUM 9.4 03/06/2023    ALBUMIN 4.2 03/06/2023    AST 17 03/06/2023    ALT 14 03/06/2023    CHOL 200 03/06/2023     (H) 03/06/2023    HDL 66 (H) 03/06/2023    TRIG 49 03/06/2023     Lab Results   Component Value Date    TSH 2.970 03/06/2023    FREET4 1.18 03/06/2023    [id number] 27.9 (L) 03/06/2023     A1C 8.3%; cr 0.7, K 5.2, ALT 11;  vit D level 34; TSH 2.32, FreeT4 1.09, FreeT3 3.2; TPO 30;  Chol 215, Trigl 58, , HDL 77, Chol/HDL ratio 2.8 on 12/16/2024    Assessment & Plan     Diagnoses and all orders for this visit:    1. Type 1 diabetes mellitus with hyperglycemia (Primary) - not @ goal  -     Hemoglobin A1c; Future  -     Comprehensive Metabolic Panel; Future  -     Microalbumin / Creatinine Urine Ratio - Urine, Clean Catch; Future    2. Hypothyroidism due to Hashimoto's thyroiditis - stable    3. Vitamin D deficiency - stable    4. Insulin pump status    Wearing a T slim insulin pump Control IQ since Nov 2022. Using DexCom G7.  Pt has agreed to wear the CGM device and share readings on a regular basis with our office.    Continue exercise.  Continue levothyroxine, Omega 3 & vit D supplements.  Bolus before eating.  Call if blood sugars are running under 100 or over 200.        Dinesh Martin MD  03/03/25  20:53 EST

## 2025-03-04 NOTE — PROGRESS NOTES
Subjective     Chief Complaint   Patient presents with    Gynecologic Exam     Annual:last pap 2/24,mammo 2/24,Discuss when next mammogram is due         History of Present Illness    Wellness exam  Sandar Davidson is a very pleasant  34 y.o. female who presents for annual exam.  Mammo Exam last year normal, Contraception NFP, Exercise 6 times a week  Elda is doing well cycles are normal every 35 days ovulating around day 22 although last month she had her first irregular menses.  She was doing a lot of international travel during that timeframe.  She has no GYN concerns or complaints.  She has diabetes and that is managed by another provider.      Obstetric History:  OB History    No obstetric history on file.        Menstrual History:     Patient's last menstrual period was 02/26/2025 (exact date).       Sexual History:       Past Medical History:   Diagnosis Date    ASCUS of cervix with negative high risk HPV     Bacterial vaginitis     Diabetes mellitus type I     History of shingles 12/01/2020    Hypothyroidism     IDM (infant of diabetic mother)     Proteinuria      Past Surgical History:   Procedure Laterality Date    TOOTH EXTRACTION      WISDOM TOOTH EXTRACTION         SOCIAL Hx:      The following portions of the patient's history were reviewed and updated as appropriate: allergies, current medications, past family history, past medical history, past social history, past surgical history and problem list.    Review of Systems        Except as outlined in history of physical illness, patient denies any changes in her GYN, , GI systems.  All other systems reviewed were negative.         Current Outpatient Medications:     CBD oil (cannabidiol) capsule, Take  by mouth., Disp: , Rfl:     Cholecalciferol 50 MCG (2000 UT) capsule, Take one daily, Disp: , Rfl:     Continuous Blood Gluc Sensor (Dexcom G7 Sensor) misc, Use 1 each As Needed (change sensor every 10 days). DX E10.65, Disp: 9 each,  "Rfl: 3    Glucagon (Baqsimi One Pack) 3 MG/DOSE powder, 3 mg into the nostril(s) as directed by provider As Needed (severe hypoglycemia)., Disp: 1 each, Rfl: 1    Glucose Blood (ELVIS CONTOUR TEST VI), by In Vitro route. Use to check blood sugar before meals and at bedtime, Disp: , Rfl:     insulin glargine (LANTUS, SEMGLEE) 100 UNIT/ML injection, Inject  under the skin into the appropriate area as directed Daily. Use in case of pump failure 28 units daily, Disp: , Rfl:     levothyroxine (Synthroid) 50 MCG tablet, Take 1 tablet by mouth Daily., Disp: 90 tablet, Rfl: 3    Magnesium 125 MG capsule, Take  by mouth Daily., Disp: , Rfl:     Multiple Vitamins-Minerals (MULTIVITAMIN ADULT PO), Take  by mouth Daily., Disp: , Rfl:     Omega-3 Fatty Acids (FISH OIL) 1000 MG capsule capsule, Take  by mouth Daily With Breakfast., Disp: , Rfl:     Insulin Lispro (humaLOG) 100 UNIT/ML injection, For use In Pump (MAXIMUM DOSE = 80 UNITS PER DAY (Patient not taking: Reported on 3/4/2025), Disp: 80 mL, Rfl: 3   Objective   Physical Exam    /60   Ht 165 cm (64.96\")   Wt 78.5 kg (173 lb)   LMP 02/26/2025 (Exact Date)   BMI 28.82 kg/m²     General: Patient is alert and oriented and appears overall healthy  Neck: Is supple without thyromegaly, no carotid bruits and no lymphadenopathy  Lungs: Clear bilaterally, no wheezing, rhonchi, or rales.  Respiratory rate is normal  Breast: Even, symmetrical, no lymphadenopathy, no retraction, no discharge, no masses or cysts  Heart: Regular rate and rhythm are appreciated, no murmurs or rubs are heard  Abdomen: Is soft, without organomegaly, bowel sounds are positive, there is no rebound or guarding and palpation does not produce any discomfort  Back: Nontender without CVA tenderness  Pelvic: External genitalia appear normal and consistent with mature female.  BUS normal                            Vagina is clean dry without discharge and appears adequately estrogenized, no lesions or " masses are present                         Cervix is noninflamed without discharge or lesions.  There is no cervical motion tenderness.                Uterus is nonenlarged, without tenderness, and no masses or abnormalities are  present               Adnexa are non-enlarged, non tender               Rectal exam reveals adequate sphincter tone and no masses or lesions are appreciated on digital rectal examination.      Annual Well Woman Exam  Patient Active Problem List   Diagnosis    IDM (infant of diabetic mother)    Vomiting    Type 1 diabetes mellitus with hyperglycemia    Type 1 diabetes    Insulin pump status    Hypothyroidism    Diabetes mellitus type I    Vitamin D deficiency                 Assessment & Plan   Diagnoses and all orders for this visit:    1. Encounter for gynecological examination (Primary)  -     IGP, Apt HPV,rfx 16 / 18,45    2. Screening for cervical cancer    3. IDM (infant of diabetic mother)       Discussed today's findings and concerns with patient.  Continue to recommend regular exercise including cardiovascular and resistance training as well as  breast self-exam. Wellness lab, mammography, & pap smear, in accordance with age guidelines.    I have encouraged her to call for today's test results if she has not received them within 10 days.  Patient is advised to call with any change in her condition or with any other questions, otherwise return  for annual examination.

## 2025-03-07 LAB
CYTOLOGIST CVX/VAG CYTO: NORMAL
CYTOLOGY CVX/VAG DOC CYTO: NORMAL
CYTOLOGY CVX/VAG DOC THIN PREP: NORMAL
DX ICD CODE: NORMAL
HPV I/H RISK 4 DNA CVX QL PROBE+SIG AMP: NEGATIVE
OTHER STN SPEC: NORMAL
SERVICE CMNT-IMP: NORMAL
STAT OF ADQ CVX/VAG CYTO-IMP: NORMAL

## 2025-04-25 DIAGNOSIS — E10.65 TYPE 1 DIABETES MELLITUS WITH HYPERGLYCEMIA: ICD-10-CM

## 2025-04-25 DIAGNOSIS — E06.3 HYPOTHYROIDISM DUE TO HASHIMOTO'S THYROIDITIS: ICD-10-CM

## 2025-04-28 RX ORDER — ACYCLOVIR 400 MG/1
TABLET ORAL
Qty: 9 EACH | Refills: 3 | Status: SHIPPED | OUTPATIENT
Start: 2025-04-28